# Patient Record
Sex: MALE | Race: WHITE | NOT HISPANIC OR LATINO | Employment: OTHER | RURAL
[De-identification: names, ages, dates, MRNs, and addresses within clinical notes are randomized per-mention and may not be internally consistent; named-entity substitution may affect disease eponyms.]

---

## 2022-04-13 RX ORDER — SILDENAFIL CITRATE 20 MG/1
TABLET ORAL
Qty: 30 TABLET | Refills: 0 | Status: SHIPPED | OUTPATIENT
Start: 2022-04-13 | End: 2022-05-31

## 2022-05-31 RX ORDER — SILDENAFIL CITRATE 20 MG/1
TABLET ORAL
Qty: 30 TABLET | Refills: 0 | Status: SHIPPED | OUTPATIENT
Start: 2022-05-31 | End: 2022-07-13 | Stop reason: SDUPTHER

## 2022-05-31 NOTE — TELEPHONE ENCOUNTER
Last Refill Date: 04/13/2022  Quantity: 30  Refills: 0     [facility] [id number] - [phone] [facility] [phone] FX    Please review pended refill request for any changes needed on refills or quantities. Thank you!

## 2022-06-03 RX ORDER — BUSPIRONE HYDROCHLORIDE 10 MG/1
TABLET ORAL
Qty: 180 TABLET | Refills: 0 | Status: SHIPPED | OUTPATIENT
Start: 2022-06-03 | End: 2022-07-13 | Stop reason: SDUPTHER

## 2022-06-03 RX ORDER — ROPINIROLE 1 MG/1
TABLET, FILM COATED ORAL
Qty: 90 TABLET | Refills: 0 | Status: SHIPPED | OUTPATIENT
Start: 2022-06-03 | End: 2022-07-13 | Stop reason: SDUPTHER

## 2022-06-15 RX ORDER — GEMFIBROZIL 600 MG/1
TABLET, FILM COATED ORAL
Qty: 180 TABLET | OUTPATIENT
Start: 2022-06-15

## 2022-06-15 RX ORDER — GEMFIBROZIL 600 MG/1
TABLET, FILM COATED ORAL
Qty: 180 TABLET | Refills: 0 | Status: SHIPPED | OUTPATIENT
Start: 2022-06-15 | End: 2022-07-13 | Stop reason: SDUPTHER

## 2022-06-23 NOTE — TELEPHONE ENCOUNTER
Pt contacted. Said that he will call back to schedule. Living between here and [place] currently while he takes care of his mom.

## 2022-07-13 ENCOUNTER — TELEMEDICINE (OUTPATIENT)
Dept: FAMILY MEDICINE CLINIC | Facility: CLINIC | Age: 62
End: 2022-07-13

## 2022-07-13 DIAGNOSIS — E11.65 TYPE 2 DIABETES MELLITUS WITH HYPERGLYCEMIA, WITHOUT LONG-TERM CURRENT USE OF INSULIN: ICD-10-CM

## 2022-07-13 DIAGNOSIS — F41.9 ANXIETY: ICD-10-CM

## 2022-07-13 DIAGNOSIS — F33.0 MAJOR DEPRESSIVE DISORDER, RECURRENT, MILD: ICD-10-CM

## 2022-07-13 DIAGNOSIS — E55.9 VITAMIN D DEFICIENCY: ICD-10-CM

## 2022-07-13 DIAGNOSIS — G25.81 RESTLESS LEG SYNDROME: ICD-10-CM

## 2022-07-13 DIAGNOSIS — Z12.5 PROSTATE CANCER SCREENING: ICD-10-CM

## 2022-07-13 DIAGNOSIS — I10 HYPERTENSION, ESSENTIAL: Primary | ICD-10-CM

## 2022-07-13 DIAGNOSIS — E78.2 HYPERLIPIDEMIA, MIXED: ICD-10-CM

## 2022-07-13 PROCEDURE — 99214 OFFICE O/P EST MOD 30 MIN: CPT | Performed by: PHYSICIAN ASSISTANT

## 2022-07-13 RX ORDER — BUSPIRONE HYDROCHLORIDE 10 MG/1
10 TABLET ORAL 2 TIMES DAILY
Qty: 180 TABLET | Refills: 1 | Status: SHIPPED | OUTPATIENT
Start: 2022-07-13 | End: 2023-01-04 | Stop reason: SDUPTHER

## 2022-07-13 RX ORDER — METOPROLOL SUCCINATE 200 MG/1
200 TABLET, EXTENDED RELEASE ORAL DAILY
Qty: 90 TABLET | Refills: 1 | Status: SHIPPED | OUTPATIENT
Start: 2022-07-13 | End: 2023-01-04 | Stop reason: SDUPTHER

## 2022-07-13 RX ORDER — METFORMIN HYDROCHLORIDE 500 MG/1
TABLET, EXTENDED RELEASE ORAL
COMMUNITY
End: 2022-12-14

## 2022-07-13 RX ORDER — ATORVASTATIN CALCIUM 40 MG/1
TABLET, FILM COATED ORAL
Qty: 90 TABLET | Refills: 1 | Status: SHIPPED | OUTPATIENT
Start: 2022-07-13 | End: 2023-01-04 | Stop reason: SDUPTHER

## 2022-07-13 RX ORDER — GEMFIBROZIL 600 MG/1
TABLET, FILM COATED ORAL
Qty: 180 TABLET | Refills: 1 | Status: SHIPPED | OUTPATIENT
Start: 2022-07-13 | End: 2023-01-04 | Stop reason: SDUPTHER

## 2022-07-13 RX ORDER — ATORVASTATIN CALCIUM 40 MG/1
TABLET, FILM COATED ORAL
COMMUNITY
End: 2022-07-13 | Stop reason: SDUPTHER

## 2022-07-13 RX ORDER — ROPINIROLE 1 MG/1
1 TABLET, FILM COATED ORAL NIGHTLY
Qty: 90 TABLET | Refills: 1 | Status: SHIPPED | OUTPATIENT
Start: 2022-07-13 | End: 2023-01-04 | Stop reason: SDUPTHER

## 2022-07-13 RX ORDER — ESOMEPRAZOLE MAGNESIUM 40 MG/1
CAPSULE, DELAYED RELEASE ORAL
COMMUNITY
Start: 2022-05-27 | End: 2022-08-26

## 2022-07-13 RX ORDER — CYCLOBENZAPRINE HCL 10 MG
TABLET ORAL
COMMUNITY
Start: 2022-06-11 | End: 2022-09-28

## 2022-07-13 RX ORDER — BLOOD SUGAR DIAGNOSTIC
STRIP MISCELLANEOUS
COMMUNITY
Start: 2022-05-26 | End: 2023-02-27 | Stop reason: ALTCHOICE

## 2022-07-13 RX ORDER — DULOXETIN HYDROCHLORIDE 60 MG/1
60 CAPSULE, DELAYED RELEASE ORAL DAILY
Qty: 90 CAPSULE | Refills: 1 | Status: SHIPPED | OUTPATIENT
Start: 2022-07-13 | End: 2023-01-04 | Stop reason: SDUPTHER

## 2022-07-13 RX ORDER — SILDENAFIL CITRATE 20 MG/1
TABLET ORAL
Qty: 30 TABLET | Refills: 1 | Status: SHIPPED | OUTPATIENT
Start: 2022-07-13 | End: 2022-10-16

## 2022-07-13 RX ORDER — LANCETS
EACH MISCELLANEOUS
COMMUNITY
Start: 2022-05-26 | End: 2023-02-27 | Stop reason: ALTCHOICE

## 2022-07-13 RX ORDER — METOPROLOL SUCCINATE 200 MG/1
TABLET, EXTENDED RELEASE ORAL
COMMUNITY
Start: 2022-05-13 | End: 2022-07-13 | Stop reason: SDUPTHER

## 2022-07-13 RX ORDER — NIFEDIPINE 30 MG/1
TABLET, FILM COATED, EXTENDED RELEASE ORAL
COMMUNITY
Start: 2022-04-11 | End: 2022-07-13 | Stop reason: SDUPTHER

## 2022-07-13 RX ORDER — DULOXETIN HYDROCHLORIDE 60 MG/1
CAPSULE, DELAYED RELEASE ORAL
COMMUNITY
Start: 2022-04-20 | End: 2022-07-13 | Stop reason: SDUPTHER

## 2022-07-13 RX ORDER — QUINAPRIL 40 MG/1
TABLET ORAL
COMMUNITY
Start: 2022-06-13 | End: 2022-09-20

## 2022-07-13 RX ORDER — NIFEDIPINE 30 MG/1
30 TABLET, FILM COATED, EXTENDED RELEASE ORAL DAILY
Qty: 90 TABLET | Refills: 1 | Status: SHIPPED | OUTPATIENT
Start: 2022-07-13 | End: 2023-01-04 | Stop reason: SDUPTHER

## 2022-07-14 NOTE — PROGRESS NOTES
Chief Complaint  Med Refill    Subjective          Zechariah Contreras presents to [facility]  Patient in today for medication recheck and refills.  Visit done through virtual visit with use of [employer].  Patient has given permission for this visit along with understands limitation of virtual visits.  He states he has been feeling well.  Denies any chest pain or shortness of breath.  He will be coming in for his fasting labs.  He states the duloxetine and buspirone continue to help with his anxiety and depression symptoms and would like to continue at this time.  Denies any side effects of medication.  He is also needing to get a refill on his metformin and Jardiance that he is taking for his diabetes management.  He is currently up-to-date on his colonoscopy.  Is due for his eye exam and he states will get scheduled.    Hypertension  This is a chronic problem. Associated symptoms include anxiety. Pertinent negatives include no blurred vision, chest pain, headaches, palpitations, peripheral edema or shortness of breath.   Hyperlipidemia  This is a chronic problem. Pertinent negatives include no chest pain or shortness of breath. Current antihyperlipidemic treatment includes statins.   Diabetes  He presents for his follow-up diabetic visit. He has type 2 diabetes mellitus. Pertinent negatives for hypoglycemia include no dizziness or headaches. Pertinent negatives for diabetes include no blurred vision, no chest pain and no fatigue. Eye exam is not current.   Anxiety  Presents for follow-up visit. Patient reports no chest pain, dizziness, nausea, palpitations or shortness of breath.     His past medical history is significant for depression.   Depression  Visit Type: follow-up  Patient is not experiencing: chest pain, dizziness, palpitations and shortness of breath.        Objective   Vital Signs:   There were no vitals taken for this visit.    There is no height or weight on file to  calculate BMI.    Review of Systems   Constitutional: Negative for fatigue and fever.   HENT: Negative for congestion and sore throat.    Eyes: Negative for blurred vision.   Respiratory: Negative for shortness of breath.    Cardiovascular: Negative for chest pain and palpitations.   Gastrointestinal: Negative for abdominal pain, constipation, diarrhea, nausea and vomiting.   Genitourinary: Negative for dysuria and frequency.   Neurological: Negative for dizziness and headache.       Past History:  Medical History: has a past medical history of Anxiety, AR (allergic rhinitis), Asthma due to environmental allergies, Chronic insomnia, Chronic low back pain, Chronic neck pain, CKD (chronic kidney disease), stage II, Depression, ED (erectile dysfunction), Essential hypertension, Fatigue, GERD (gastroesophageal reflux disease), High risk medication use, IBS (irritable bowel syndrome), Idiopathic nonfamilial dystonia, Impotence, Insomnia, Insomnia, Major depression in remission (HCC), Mixed hyperlipidemia, Morbid obesity ([facility]), JESSICA (obstructive sleep apnea), RLS (restless legs syndrome), Rotator cuff tear, Sinus headache, Snoring, Spasmodic torticollis, Type 2 diabetes mellitus ([facility]), and Vitamin D deficiency.   Surgical History: has a past surgical history that includes Rotator cuff repair.   Family History: family history includes Lung cancer in his father.   Social History: reports that he has never smoked. He has never used smokeless tobacco.      Current Outpatient Medications:   •  atorvastatin (LIPITOR) 40 MG tablet, Take one pill by oral route once a day., Disp: 90 tablet, Rfl: 1  •  busPIRone (BUSPAR) 10 MG tablet, Take 1 tablet by mouth 2 (Two) Times a Day., Disp: 180 tablet, Rfl: 1  •  cyclobenzaprine (FLEXERIL) 10 MG tablet, , Disp: , Rfl:   •  DULoxetine (CYMBALTA) 60 MG capsule, Take 1 capsule by mouth Daily., Disp: 90 capsule, Rfl: 1  •  empagliflozin (Jardiance) 10 MG tablet tablet, Take 1 tablet by mouth  Daily., Disp: 90 tablet, Rfl: 1  •  esomeprazole (nexIUM) 40 MG capsule, , Disp: , Rfl:   •  gemfibrozil (LOPID) 600 MG tablet, Take one tablet by mouth twice a day 30 minutes before morning and evening meals., Disp: 180 tablet, Rfl: 1  •  Lancets (onetouch ultrasoft) lancets, , Disp: , Rfl:   •  metFORMIN ER (GLUCOPHAGE-XR) 500 MG 24 hr tablet, metformin  mg tablet,extended release 24 hr, Disp: , Rfl:   •  metoprolol succinate XL (TOPROL-XL) 200 MG 24 hr tablet, Take 1 tablet by mouth Daily., Disp: 90 tablet, Rfl: 1  •  NIFEdipine CC (ADALAT CC) 30 MG 24 hr tablet, Take 1 tablet by mouth Daily., Disp: 90 tablet, Rfl: 1  •  OneTouch Ultra test strip, , Disp: , Rfl:   •  quinapril (ACCUPRIL) 40 MG tablet, , Disp: , Rfl:   •  rOPINIRole (REQUIP) 1 MG tablet, Take 1 tablet by mouth Every Night., Disp: 90 tablet, Rfl: 1  •  sildenafil (REVATIO) 20 MG tablet, Take 2 - 5 tablets by mouth 30 minutes prior to intercourse as needed, Disp: 30 tablet, Rfl: 1  Allergies: Amoxicillin    Physical Exam  Constitutional:       Appearance: Normal appearance.   Pulmonary:      Effort: Pulmonary effort is normal.   Abdominal:      Tenderness: There is no abdominal tenderness.   Psychiatric:         Mood and Affect: Mood normal.         Behavior: Behavior normal.             Assessment and Plan   Diagnoses and all orders for this visit:    1. Hypertension, essential (Primary)  -     CBC & Differential; Future  -     Comprehensive Metabolic Panel; Future  -     TSH; Future  Patient voices understanding of limitation of virtual visit.  Recommend to  monitor blood pressure.  He will continue on current dosage of medication at this time.  Return to clinic prior to his next recheck as needed.  2. Hyperlipidemia, mixed  -     Lipid Panel; Future  Refilled atorvastatin and gemfibrozil.  Patient denies any side effects of medication.  He will come in for fasting labs.  Encouraged healthy diet and exercise.  3. Major depressive disorder,  recurrent, mild (HCC)  Patient is currently pleased with the current dosage of duloxetine and buspirone and would like to continue at this time.  Denies side effects of medication.  Return to clinic prior to recheck as needed.  4. Anxiety    5. Type 2 diabetes mellitus with hyperglycemia, without long-term current use of insulin (HCC)  -     Hemoglobin A1c; Future  Encourage patient to monitor his sugar levels at home.  Encouraged healthy diet and exercise.  Refilled  the Jardiance and metformin.  We will check hemoglobin A1c with labs.  6. Prostate cancer screening  -     PSA Screen; Future  PSA with labs.  7. Vitamin D deficiency  -     Vitamin D 25 Hydroxy; Future  Vitamin D with labs.  8. Restless leg syndrome  Refilled requip- pt states continues to work well for his restless leg symptoms.   Other orders  -     empagliflozin (Jardiance) 10 MG tablet tablet; Take 1 tablet by mouth Daily.  Dispense: 90 tablet; Refill: 1  -     DULoxetine (CYMBALTA) 60 MG capsule; Take 1 capsule by mouth Daily.  Dispense: 90 capsule; Refill: 1  -     busPIRone (BUSPAR) 10 MG tablet; Take 1 tablet by mouth 2 (Two) Times a Day.  Dispense: 180 tablet; Refill: 1  -     NIFEdipine CC (ADALAT CC) 30 MG 24 hr tablet; Take 1 tablet by mouth Daily.  Dispense: 90 tablet; Refill: 1  -     gemfibrozil (LOPID) 600 MG tablet; Take one tablet by mouth twice a day 30 minutes before morning and evening meals.  Dispense: 180 tablet; Refill: 1  -     atorvastatin (LIPITOR) 40 MG tablet; Take one pill by oral route once a day.  Dispense: 90 tablet; Refill: 1  -     metoprolol succinate XL (TOPROL-XL) 200 MG 24 hr tablet; Take 1 tablet by mouth Daily.  Dispense: 90 tablet; Refill: 1  -     rOPINIRole (REQUIP) 1 MG tablet; Take 1 tablet by mouth Every Night.  Dispense: 90 tablet; Refill: 1  -     sildenafil (REVATIO) 20 MG tablet; Take 2 - 5 tablets by mouth 30 minutes prior to intercourse as needed  Dispense: 30 tablet; Refill: 1            Follow Up    No follow-ups on file.  Patient was given instructions and counseling regarding his condition or for health maintenance advice. Please see specific information pulled into the AVS if appropriate.     Divya Elise PA-C

## 2022-08-26 RX ORDER — ESOMEPRAZOLE MAGNESIUM 40 MG/1
CAPSULE, DELAYED RELEASE ORAL
Qty: 90 CAPSULE | Refills: 1 | Status: SHIPPED | OUTPATIENT
Start: 2022-08-26 | End: 2023-01-04 | Stop reason: SDUPTHER

## 2022-09-20 RX ORDER — QUINAPRIL 40 MG/1
TABLET ORAL
Qty: 90 TABLET | Refills: 0 | Status: SHIPPED | OUTPATIENT
Start: 2022-09-20 | End: 2023-01-04 | Stop reason: SDUPTHER

## 2022-09-26 NOTE — TELEPHONE ENCOUNTER
Please check to see if need to send a refill as appears last one sent in 11/2021---- also please remind patient he has not come in for fasting labs from telehealth visit in 7/2022; thanks

## 2022-09-27 ENCOUNTER — TELEPHONE (OUTPATIENT)
Dept: FAMILY MEDICINE CLINIC | Facility: CLINIC | Age: 62
End: 2022-09-27

## 2022-09-27 RX ORDER — BACLOFEN 20 MG/1
TABLET ORAL
Qty: 360 TABLET | OUTPATIENT
Start: 2022-09-27

## 2022-09-27 NOTE — TELEPHONE ENCOUNTER
Please double check with patient- he has cyclobenzaprine in his active medication list- not from us- does not need baclofen if taking this; thanks

## 2022-09-29 RX ORDER — BACLOFEN 20 MG/1
TABLET ORAL
Qty: 270 TABLET | Refills: 0 | Status: SHIPPED | OUTPATIENT
Start: 2022-09-29 | End: 2023-01-04 | Stop reason: SDUPTHER

## 2022-09-29 RX ORDER — BACLOFEN 20 MG/1
TABLET ORAL
Qty: 360 TABLET | OUTPATIENT
Start: 2022-09-29

## 2022-09-29 NOTE — TELEPHONE ENCOUNTER
Sent back to [facility] looks like this was the binta we were trying to figure out if he was taking flexeril and this but he is only using this and they are calling again?

## 2022-10-04 ENCOUNTER — TELEPHONE (OUTPATIENT)
Dept: FAMILY MEDICINE CLINIC | Facility: CLINIC | Age: 62
End: 2022-10-04

## 2022-10-16 RX ORDER — SILDENAFIL CITRATE 20 MG/1
TABLET ORAL
Qty: 30 TABLET | Refills: 0 | Status: SHIPPED | OUTPATIENT
Start: 2022-10-16 | End: 2022-12-19

## 2022-10-20 ENCOUNTER — TELEPHONE (OUTPATIENT)
Dept: FAMILY MEDICINE CLINIC | Facility: CLINIC | Age: 62
End: 2022-10-20

## 2022-10-20 RX ORDER — FLUTICASONE PROPIONATE 50 MCG
SPRAY, SUSPENSION (ML) NASAL
Qty: 48 G | Refills: 0 | Status: SHIPPED | OUTPATIENT
Start: 2022-10-20

## 2022-10-20 RX ORDER — SILDENAFIL CITRATE 20 MG/1
TABLET ORAL
Qty: 30 TABLET | Refills: 0 | OUTPATIENT
Start: 2022-10-20

## 2022-10-20 NOTE — TELEPHONE ENCOUNTER
He said it was actually flonase and looking back in the old chart he had got it before the script had just .

## 2022-12-01 ENCOUNTER — TELEPHONE (OUTPATIENT)
Dept: FAMILY MEDICINE CLINIC | Facility: CLINIC | Age: 62
End: 2022-12-01

## 2022-12-01 NOTE — TELEPHONE ENCOUNTER
Caller: ESTEPHANIA PETERSEN    Relationship to patient: Emergency Contact    Best call back number: [phone]    Patient is needing: ESTEPHANIA STATED THAT PATIENT WAS WANTING TO KNOW WHEN HIS NEXT COLONOSCOPY DUE    PLEASE ADVISE

## 2022-12-13 RX ORDER — BACLOFEN 20 MG/1
TABLET ORAL
Qty: 270 TABLET | Refills: 0 | OUTPATIENT
Start: 2022-12-13

## 2022-12-13 RX ORDER — QUINAPRIL 40 MG/1
TABLET ORAL
Qty: 90 TABLET | Refills: 0 | OUTPATIENT
Start: 2022-12-13

## 2022-12-14 ENCOUNTER — TELEMEDICINE (OUTPATIENT)
Dept: FAMILY MEDICINE CLINIC | Facility: CLINIC | Age: 62
End: 2022-12-14

## 2022-12-14 DIAGNOSIS — Z12.5 PROSTATE CANCER SCREENING: ICD-10-CM

## 2022-12-14 DIAGNOSIS — G25.81 RESTLESS LEG SYNDROME: ICD-10-CM

## 2022-12-14 DIAGNOSIS — E78.2 HYPERLIPIDEMIA, MIXED: ICD-10-CM

## 2022-12-14 DIAGNOSIS — E11.65 TYPE 2 DIABETES MELLITUS WITH HYPERGLYCEMIA, WITHOUT LONG-TERM CURRENT USE OF INSULIN: ICD-10-CM

## 2022-12-14 DIAGNOSIS — F41.9 ANXIETY: ICD-10-CM

## 2022-12-14 DIAGNOSIS — F33.0 MAJOR DEPRESSIVE DISORDER, RECURRENT, MILD: ICD-10-CM

## 2022-12-14 DIAGNOSIS — J30.9 ALLERGIC RHINITIS, UNSPECIFIED SEASONALITY, UNSPECIFIED TRIGGER: ICD-10-CM

## 2022-12-14 DIAGNOSIS — K21.9 GASTROESOPHAGEAL REFLUX DISEASE, UNSPECIFIED WHETHER ESOPHAGITIS PRESENT: ICD-10-CM

## 2022-12-14 DIAGNOSIS — I10 HYPERTENSION, ESSENTIAL: Primary | ICD-10-CM

## 2022-12-14 DIAGNOSIS — M79.10 MYALGIA: ICD-10-CM

## 2022-12-14 PROCEDURE — 99214 OFFICE O/P EST MOD 30 MIN: CPT | Performed by: PHYSICIAN ASSISTANT

## 2022-12-14 NOTE — PROGRESS NOTES
Chief Complaint  Med Refill    Subjective         Zechariah Contreras presents to [facility]  History of Present Illness  Visit done through [employer], patient unable to access [employer].  Visit done with patient permission.  He is in today for medication recheck and refills.  States overall has been doing well.  He admits he does not check his blood pressure or his sugar levels at home very often.  He is due for an eye exam and states will get scheduled.  He was due to come in previously for some recheck on labs and had not gotten those done yet.  He is on the lab schedule in 2 days to come in and have fasting labs drawn.  He states he has been taking his medication daily.  Denies any chest pain or shortness of breath.  Denies any bowel or urine changes.  He states the duloxetine and buspirone continue to work well for his anxiety and depression symptoms.Denies any side effects of medications.   Hypertension  This is a chronic problem. Associated symptoms include anxiety. Pertinent negatives include no blurred vision, chest pain, headaches, palpitations, peripheral edema or shortness of breath.   Hyperlipidemia  This is a chronic problem. Pertinent negatives include no chest pain or shortness of breath. Current antihyperlipidemic treatment includes statins.   Anxiety  Presents for follow-up visit. Symptoms include depressed mood and nervous/anxious behavior. Patient reports no chest pain, dizziness, nausea, palpitations or shortness of breath.     His past medical history is significant for depression.   Depression  Visit Type: follow-up  Patient presents with the following symptoms: depressed mood and nervousness/anxiety.  Patient is not experiencing: chest pain, palpitations and shortness of breath.    Back Pain  This is a chronic problem. The current episode started more than 1 year ago. The quality of the pain is described as aching. Pertinent negatives include no abdominal pain, chest  pain, dysuria, fever or headaches.   Heartburn  He complains of heartburn. He reports no abdominal pain, no chest pain, no coughing, no hoarse voice, no nausea or no sore throat. Pertinent negatives include no fatigue.     Review of Systems   Constitutional: Negative for fatigue and fever.   HENT: Negative for congestion, hoarse voice and sore throat.    Eyes: Negative for blurred vision.   Respiratory: Negative for cough and shortness of breath.    Cardiovascular: Negative for chest pain and palpitations.   Gastrointestinal: Negative for abdominal pain, diarrhea, nausea and vomiting.   Genitourinary: Negative for dysuria and frequency.   Musculoskeletal: Positive for back pain.   Neurological: Negative for dizziness and headache.   Psychiatric/Behavioral: Positive for depressed mood. The patient is nervous/anxious.        Objective   Vital Signs:   There were no vitals taken for this visit.    Physical Exam   Constitutional: He appears well-developed and well-nourished.   Pulmonary/Chest: Effort normal.   Psychiatric: He has a normal mood and affect.     Result Review :                 Assessment and Plan    Diagnoses and all orders for this visit:    1. Hypertension, essential (Primary)  Discussed limitation of virtual visit. Patient to come in for fasting labs this week and recommend to get bp checked when here. Encouraged to monitor at home. RTC prior to recheck as needed.   2. Hyperlipidemia, mixed  Refilled meds and will check fasting lipid panel with labs.   3. Type 2 diabetes mellitus with hyperglycemia, without long-term current use of insulin (HCC)  Encouraged patient to monitor sugar levels at home. Will check hga1c with labs and may need to adjust medication.   4. Anxiety  Patient is pleased with the current dosage of duloxetine and buspar and would like to continue at this time. Denies side effects of medication.   5. Major depressive disorder, recurrent, mild (HCC)    6. Allergic rhinitis, unspecified  seasonality, unspecified trigger  Refilled flonase.   7. Gastroesophageal reflux disease, unspecified whether esophagitis present  Refilled nexium for reflux.   8. Myalgia  Baclofen refilled to use prn- pt denies side effects of medication.   9. Prostate cancer screening  PSA with labs.   10. Restless leg syndrome  Refilled ropinirole.     Follow Up   No follow-ups on file.  Patient was given instructions and counseling regarding his condition or for health maintenance advice. Please see specific information pulled into the AVS if appropriate.     Mode of Visit: Video  Location of patient: home  Location of provider[facility]  You have chosen to receive care through a telehealth visit.  Does the patient consent to use a video/audio connection for your medical care today? Yes  The visit included audio and video interaction. No technical issues occurred during this visit.

## 2022-12-16 ENCOUNTER — LAB (OUTPATIENT)
Dept: FAMILY MEDICINE CLINIC | Facility: CLINIC | Age: 62
End: 2022-12-16

## 2022-12-16 DIAGNOSIS — E11.65 TYPE 2 DIABETES MELLITUS WITH HYPERGLYCEMIA, WITHOUT LONG-TERM CURRENT USE OF INSULIN: ICD-10-CM

## 2022-12-16 DIAGNOSIS — Z12.5 PROSTATE CANCER SCREENING: ICD-10-CM

## 2022-12-16 DIAGNOSIS — E78.2 HYPERLIPIDEMIA, MIXED: ICD-10-CM

## 2022-12-16 DIAGNOSIS — E55.9 VITAMIN D DEFICIENCY: ICD-10-CM

## 2022-12-16 DIAGNOSIS — I10 HYPERTENSION, ESSENTIAL: ICD-10-CM

## 2022-12-16 PROCEDURE — 36415 COLL VENOUS BLD VENIPUNCTURE: CPT | Performed by: PHYSICIAN ASSISTANT

## 2022-12-17 LAB
25(OH)D3+25(OH)D2 SERPL-MCNC: 27.2 NG/ML (ref 30–100)
ALBUMIN SERPL-MCNC: 5.1 G/DL (ref 3.8–4.8)
ALBUMIN/GLOB SERPL: 2 {RATIO} (ref 1.2–2.2)
ALP SERPL-CCNC: 90 IU/L (ref 44–121)
ALT SERPL-CCNC: 19 IU/L (ref 0–44)
AST SERPL-CCNC: 15 IU/L (ref 0–40)
BASOPHILS # BLD AUTO: 0 X10E3/UL (ref 0–0.2)
BASOPHILS NFR BLD AUTO: 1 %
BILIRUB SERPL-MCNC: <0.2 MG/DL (ref 0–1.2)
BUN SERPL-MCNC: 20 MG/DL (ref 8–27)
BUN/CREAT SERPL: 18 (ref 10–24)
CALCIUM SERPL-MCNC: 10.4 MG/DL (ref 8.6–10.2)
CHLORIDE SERPL-SCNC: 104 MMOL/L (ref 96–106)
CHOLEST SERPL-MCNC: 184 MG/DL (ref 100–199)
CO2 SERPL-SCNC: 20 MMOL/L (ref 20–29)
CREAT SERPL-MCNC: 1.12 MG/DL (ref 0.76–1.27)
EGFRCR SERPLBLD CKD-EPI 2021: 74 ML/MIN/1.73
EOSINOPHIL # BLD AUTO: 0.2 X10E3/UL (ref 0–0.4)
EOSINOPHIL NFR BLD AUTO: 4 %
ERYTHROCYTE [DISTWIDTH] IN BLOOD BY AUTOMATED COUNT: 11.7 % (ref 11.6–15.4)
GLOBULIN SER CALC-MCNC: 2.5 G/DL (ref 1.5–4.5)
GLUCOSE SERPL-MCNC: 271 MG/DL (ref 70–99)
HBA1C MFR BLD: 11.2 % (ref 4.8–5.6)
HCT VFR BLD AUTO: 43.6 % (ref 37.5–51)
HDLC SERPL-MCNC: 29 MG/DL
HGB BLD-MCNC: 14.9 G/DL (ref 13–17.7)
IMM GRANULOCYTES # BLD AUTO: 0 X10E3/UL (ref 0–0.1)
IMM GRANULOCYTES NFR BLD AUTO: 1 %
LDLC SERPL CALC-MCNC: 99 MG/DL (ref 0–99)
LYMPHOCYTES # BLD AUTO: 1.9 X10E3/UL (ref 0.7–3.1)
LYMPHOCYTES NFR BLD AUTO: 37 %
MCH RBC QN AUTO: 30.9 PG (ref 26.6–33)
MCHC RBC AUTO-ENTMCNC: 34.2 G/DL (ref 31.5–35.7)
MCV RBC AUTO: 91 FL (ref 79–97)
MONOCYTES # BLD AUTO: 0.6 X10E3/UL (ref 0.1–0.9)
MONOCYTES NFR BLD AUTO: 11 %
NEUTROPHILS # BLD AUTO: 2.5 X10E3/UL (ref 1.4–7)
NEUTROPHILS NFR BLD AUTO: 46 %
PLATELET # BLD AUTO: 310 X10E3/UL (ref 150–450)
POTASSIUM SERPL-SCNC: 5.5 MMOL/L (ref 3.5–5.2)
PROT SERPL-MCNC: 7.6 G/DL (ref 6–8.5)
PSA SERPL-MCNC: 0.1 NG/ML (ref 0–4)
RBC # BLD AUTO: 4.82 X10E6/UL (ref 4.14–5.8)
SODIUM SERPL-SCNC: 142 MMOL/L (ref 134–144)
TRIGL SERPL-MCNC: 328 MG/DL (ref 0–149)
TSH SERPL DL<=0.005 MIU/L-ACNC: 1.15 UIU/ML (ref 0.45–4.5)
VLDLC SERPL CALC-MCNC: 56 MG/DL (ref 5–40)
WBC # BLD AUTO: 5.3 X10E3/UL (ref 3.4–10.8)

## 2022-12-19 ENCOUNTER — TELEPHONE (OUTPATIENT)
Dept: FAMILY MEDICINE CLINIC | Facility: CLINIC | Age: 62
End: 2022-12-19

## 2022-12-19 DIAGNOSIS — E87.5 HYPERKALEMIA: ICD-10-CM

## 2022-12-19 DIAGNOSIS — E11.65 TYPE 2 DIABETES MELLITUS WITH HYPERGLYCEMIA, WITHOUT LONG-TERM CURRENT USE OF INSULIN: Primary | ICD-10-CM

## 2022-12-19 RX ORDER — SILDENAFIL CITRATE 20 MG/1
TABLET ORAL
Qty: 30 TABLET | Refills: 0 | Status: SHIPPED | OUTPATIENT
Start: 2022-12-19 | End: 2023-02-24

## 2022-12-22 RX ORDER — SILDENAFIL CITRATE 20 MG/1
TABLET ORAL
Qty: 30 TABLET | Refills: 0 | OUTPATIENT
Start: 2022-12-22

## 2023-01-03 ENCOUNTER — LAB (OUTPATIENT)
Dept: FAMILY MEDICINE CLINIC | Facility: CLINIC | Age: 63
End: 2023-01-03
Payer: MEDICAID

## 2023-01-03 DIAGNOSIS — E87.5 HYPERKALEMIA: ICD-10-CM

## 2023-01-03 PROCEDURE — 36415 COLL VENOUS BLD VENIPUNCTURE: CPT | Performed by: PHYSICIAN ASSISTANT

## 2023-01-03 NOTE — TELEPHONE ENCOUNTER
Caller: ESTEPHANIA PETERSEN    Relationship: Emergency Contact    Best call back number: [phone]    Requested Prescriptions:   Requested Prescriptions     Pending Prescriptions Disp Refills   • quinapril (ACCUPRIL) 40 MG tablet 90 tablet 0     Sig: Take 1 tablet by mouth Daily.        Pharmacy where request should be sent: [facility] [id number] [address] - [phone]  - [phone] FX     Additional details provided by patient: HAS 6 DAYS LEFT    Does the patient have less than a 3 day supply:  [] Yes  [x] No    Would you like a call back once the refill request has been completed: [x] Yes [] No    If the office needs to give you a call back, can they leave a voicemail: [x] Yes [] No    Birdie Benitez Rep   01/03/23 14:57 EST

## 2023-01-04 LAB
ALBUMIN SERPL-MCNC: 5.1 G/DL (ref 3.8–4.8)
ALBUMIN/GLOB SERPL: 2 {RATIO} (ref 1.2–2.2)
ALP SERPL-CCNC: 100 IU/L (ref 44–121)
ALT SERPL-CCNC: 24 IU/L (ref 0–44)
AST SERPL-CCNC: 22 IU/L (ref 0–40)
BILIRUB SERPL-MCNC: <0.2 MG/DL (ref 0–1.2)
BUN SERPL-MCNC: 17 MG/DL (ref 8–27)
BUN/CREAT SERPL: 15 (ref 10–24)
CALCIUM SERPL-MCNC: 10.3 MG/DL (ref 8.6–10.2)
CHLORIDE SERPL-SCNC: 103 MMOL/L (ref 96–106)
CO2 SERPL-SCNC: 21 MMOL/L (ref 20–29)
CREAT SERPL-MCNC: 1.17 MG/DL (ref 0.76–1.27)
EGFRCR SERPLBLD CKD-EPI 2021: 70 ML/MIN/1.73
GLOBULIN SER CALC-MCNC: 2.6 G/DL (ref 1.5–4.5)
GLUCOSE SERPL-MCNC: 297 MG/DL (ref 70–99)
POTASSIUM SERPL-SCNC: 5.5 MMOL/L (ref 3.5–5.2)
PROT SERPL-MCNC: 7.7 G/DL (ref 6–8.5)
SODIUM SERPL-SCNC: 144 MMOL/L (ref 134–144)

## 2023-01-04 RX ORDER — GEMFIBROZIL 600 MG/1
TABLET, FILM COATED ORAL
Qty: 180 TABLET | Refills: 1 | Status: SHIPPED | OUTPATIENT
Start: 2023-01-04

## 2023-01-04 RX ORDER — BUSPIRONE HYDROCHLORIDE 10 MG/1
10 TABLET ORAL 2 TIMES DAILY
Qty: 180 TABLET | Refills: 1 | Status: SHIPPED | OUTPATIENT
Start: 2023-01-04

## 2023-01-04 RX ORDER — METOPROLOL SUCCINATE 200 MG/1
200 TABLET, EXTENDED RELEASE ORAL DAILY
Qty: 90 TABLET | Refills: 1 | Status: SHIPPED | OUTPATIENT
Start: 2023-01-04

## 2023-01-04 RX ORDER — ESOMEPRAZOLE MAGNESIUM 40 MG/1
40 CAPSULE, DELAYED RELEASE ORAL DAILY
Qty: 90 CAPSULE | Refills: 1 | Status: SHIPPED | OUTPATIENT
Start: 2023-01-04

## 2023-01-04 RX ORDER — BACLOFEN 20 MG/1
TABLET ORAL
Qty: 270 TABLET | Refills: 0 | Status: SHIPPED | OUTPATIENT
Start: 2023-01-04 | End: 2023-03-03

## 2023-01-04 RX ORDER — DULOXETIN HYDROCHLORIDE 60 MG/1
60 CAPSULE, DELAYED RELEASE ORAL DAILY
Qty: 90 CAPSULE | Refills: 1 | Status: SHIPPED | OUTPATIENT
Start: 2023-01-04

## 2023-01-04 RX ORDER — ATORVASTATIN CALCIUM 40 MG/1
TABLET, FILM COATED ORAL
Qty: 90 TABLET | Refills: 1 | Status: SHIPPED | OUTPATIENT
Start: 2023-01-04

## 2023-01-04 RX ORDER — QUINAPRIL 40 MG/1
40 TABLET ORAL DAILY
Qty: 90 TABLET | Refills: 1 | Status: SHIPPED | OUTPATIENT
Start: 2023-01-04 | End: 2023-01-11 | Stop reason: SDUPTHER

## 2023-01-04 RX ORDER — NIFEDIPINE 30 MG/1
30 TABLET, FILM COATED, EXTENDED RELEASE ORAL DAILY
Qty: 90 TABLET | Refills: 1 | Status: SHIPPED | OUTPATIENT
Start: 2023-01-04

## 2023-01-04 RX ORDER — ROPINIROLE 1 MG/1
1 TABLET, FILM COATED ORAL NIGHTLY
Qty: 90 TABLET | Refills: 1 | Status: SHIPPED | OUTPATIENT
Start: 2023-01-04

## 2023-01-05 RX ORDER — QUINAPRIL 40 MG/1
40 TABLET ORAL DAILY
Qty: 90 TABLET | Refills: 0 | OUTPATIENT
Start: 2023-01-05

## 2023-01-06 ENCOUNTER — TELEPHONE (OUTPATIENT)
Dept: FAMILY MEDICINE CLINIC | Facility: CLINIC | Age: 63
End: 2023-01-06
Payer: MEDICAID

## 2023-01-06 NOTE — TELEPHONE ENCOUNTER
Pharmacy called said pt quinapril is on back order is there something else that can be called in?

## 2023-01-06 NOTE — TELEPHONE ENCOUNTER
I would recommend a short term refill at another pharmacy if this is only at his current one until they can get it in.

## 2023-01-06 NOTE — TELEPHONE ENCOUNTER
ESTEPHANIA CALLED TO SAY QUINAPRIL IS ON BACK ORDER.  IS THERE SOMETHING ELSE MILLY CAN TAKE?  HE HAS ENOUGH UNTIL [id number]

## 2023-01-09 ENCOUNTER — TELEPHONE (OUTPATIENT)
Dept: FAMILY MEDICINE CLINIC | Facility: CLINIC | Age: 63
End: 2023-01-09
Payer: MEDICAID

## 2023-01-09 NOTE — TELEPHONE ENCOUNTER
[facility] called and stated this patient was transferring there meds over and the Quinapril is on backorder and they aren't sure when they will get it back in stock. They have even called [facility] and they are ou as well. They was needing something else called in for him until they can get it back in stock.

## 2023-01-09 NOTE — TELEPHONE ENCOUNTER
Caller: ESTEPHANIA PETERSEN    Relationship: Emergency Contact    Best call back number:    [phone]      Who are you requesting to speak with (clinical staff, provider,  specific staff member): CLINICAL  What was the call regarding: QUINAPRIL CAN PITIENT TAKE THE 20 MG TWICE DAILY OR 2 AT A TIME SINCE ON BACK ORDER ON THE 40 MG  Do you require a callback: YES, PLEASE ADVISE

## 2023-01-11 ENCOUNTER — TELEPHONE (OUTPATIENT)
Dept: FAMILY MEDICINE CLINIC | Facility: CLINIC | Age: 63
End: 2023-01-11
Payer: MEDICAID

## 2023-01-11 RX ORDER — QUINAPRIL 20 MG/1
40 TABLET ORAL DAILY
Qty: 60 TABLET | Refills: 1 | Status: SHIPPED | OUTPATIENT
Start: 2023-01-11

## 2023-01-11 NOTE — TELEPHONE ENCOUNTER
Mrs. Contreras called again - said they have heard nothing about the issue with Pt's QUINAPRIL. [facility] said they were completely out of this medication, and [facility] has it but only in 20mg. Pt needs to know if they can switch to the 20mg (if a new script is needed) and if he should take 2 tabs 1 time per day, or 1 tab 2x per day. Pt is completely out of this medication at this time. Please call Mily to advise [phone]

## 2023-01-11 NOTE — TELEPHONE ENCOUNTER
He can take 2 of the 20 mg tablets at this point. I would recommend that Genny decide if this is the best course of action until the medication is not on back order any further. I will send in the 20mg 2 tablets daily.

## 2023-01-11 NOTE — TELEPHONE ENCOUNTER
Caller: ESTEPHANIA PETERSEN    Relationship: Emergency Contact    Best call back number: [phone]    What was the call regarding: PATIENT WAS GIVEN HUB TO READ MESSAGE.  PATIENT ASKED FOR quinapril (ACCUPRIL) 20 MG tablet TO GO TO [id number] [place], [place] - [address] [facility] AT [facility] [address] [facility] - [phone] [facility] [phone] FX      Do you require a callback: YES

## 2023-01-11 NOTE — TELEPHONE ENCOUNTER
"LVM for pt to call back  HUB TO READ  \"He can take 2 of the 20 mg tablets at this point. I would recommend that Genny decide if this is the best course of action until the medication is not on back order any further. I will send in the 20mg 2 tablets daily.\"  "

## 2023-01-19 ENCOUNTER — TELEPHONE (OUTPATIENT)
Dept: FAMILY MEDICINE CLINIC | Facility: CLINIC | Age: 63
End: 2023-01-19
Payer: MEDICAID

## 2023-01-19 DIAGNOSIS — E87.5 HYPERKALEMIA: Primary | ICD-10-CM

## 2023-01-19 NOTE — TELEPHONE ENCOUNTER
Please let know recheck on labs continues to show mild elevation to calcium at 10.3  and sugar elevated at 297- please keep f/up with endocrinology -- also potassium recheck remained same at 5.5-- this often times can be from where blood hemolyzes in tube and causes false elevation- only way to know if that is source or if really elevated is to have blood drawn at hospital where the lab is on site-- so would recommend to have him  an order and have drawn at hospital-- if is staying elevated would need to adjust medication; thanks.

## 2023-01-19 NOTE — TELEPHONE ENCOUNTER
----- Message from Candice Cheatham MA sent at 1/6/2023  3:36 PM EST -----  Sending to Genny. Per lino it can wait until you get back.

## 2023-02-15 ENCOUNTER — PATIENT OUTREACH (OUTPATIENT)
Dept: CASE MANAGEMENT | Facility: OTHER | Age: 63
End: 2023-02-15
Payer: MEDICAID

## 2023-02-15 ENCOUNTER — TELEPHONE (OUTPATIENT)
Dept: CASE MANAGEMENT | Facility: OTHER | Age: 63
End: 2023-02-15
Payer: MEDICAID

## 2023-02-15 DIAGNOSIS — F33.0 MAJOR DEPRESSIVE DISORDER, RECURRENT, MILD: ICD-10-CM

## 2023-02-15 DIAGNOSIS — E11.65 TYPE 2 DIABETES MELLITUS WITH HYPERGLYCEMIA, WITHOUT LONG-TERM CURRENT USE OF INSULIN: Primary | ICD-10-CM

## 2023-02-15 NOTE — OUTREACH NOTE
AMBULATORY CASE MANAGEMENT NOTE    Name and Relationship of Patient/Support Person: Ben Zechariah N - Self  Self    CCM Interim Update    Called patient to discuss CCM services. He is interested, but he would like for insurance to be contacted prior.    Patient states he is doing better since hospitalization. He states he fell and was sent to [facility] for further evaluation. Patient is somewhat bruised. He is not sure what caused fall, because he does not remember incident. Encouraged patient to avoid strenuous activities and rest between actions.     Spoke to patient about diabetes and hypertension management. Advised patient to record blood pressure and blood sugar values twice daily for MD review. Also advised patient to contact ACM if blood sugar is less than 100 or greater than 200. Pt verbalized understanding.       Education Documentation  No documentation found.        Tisha FLORES  Ambulatory Case Management    2/15/2023, 15:49 EST

## 2023-02-22 ENCOUNTER — TELEPHONE (OUTPATIENT)
Dept: CASE MANAGEMENT | Facility: OTHER | Age: 63
End: 2023-02-22
Payer: MEDICAID

## 2023-02-23 ENCOUNTER — TELEPHONE (OUTPATIENT)
Dept: CASE MANAGEMENT | Facility: OTHER | Age: 63
End: 2023-02-23
Payer: MEDICAID

## 2023-02-24 RX ORDER — SILDENAFIL CITRATE 20 MG/1
TABLET ORAL
Qty: 30 TABLET | Refills: 0 | Status: SHIPPED | OUTPATIENT
Start: 2023-02-24

## 2023-02-27 ENCOUNTER — TELEPHONE (OUTPATIENT)
Dept: CASE MANAGEMENT | Facility: OTHER | Age: 63
End: 2023-02-27
Payer: MEDICAID

## 2023-02-27 ENCOUNTER — PATIENT OUTREACH (OUTPATIENT)
Dept: CASE MANAGEMENT | Facility: OTHER | Age: 63
End: 2023-02-27
Payer: MEDICAID

## 2023-02-27 DIAGNOSIS — F33.0 MAJOR DEPRESSIVE DISORDER, RECURRENT, MILD: ICD-10-CM

## 2023-02-27 DIAGNOSIS — E11.65 TYPE 2 DIABETES MELLITUS WITH HYPERGLYCEMIA, WITHOUT LONG-TERM CURRENT USE OF INSULIN: Primary | ICD-10-CM

## 2023-02-27 PROCEDURE — 99490 CHRNC CARE MGMT STAFF 1ST 20: CPT | Performed by: PHYSICIAN ASSISTANT

## 2023-02-27 PROCEDURE — 99439 CHRNC CARE MGMT STAF EA ADDL: CPT | Performed by: PHYSICIAN ASSISTANT

## 2023-02-27 RX ORDER — SILDENAFIL CITRATE 20 MG/1
TABLET ORAL
Qty: 30 TABLET | Refills: 0 | OUTPATIENT
Start: 2023-02-27

## 2023-02-27 RX ORDER — EMPAGLIFLOZIN 10 MG/1
TABLET, FILM COATED ORAL
Qty: 90 TABLET | Refills: 1 | OUTPATIENT
Start: 2023-02-27

## 2023-02-27 RX ORDER — LANCETS 30 GAUGE
EACH MISCELLANEOUS
Qty: 100 EACH | Refills: 3 | Status: SHIPPED | OUTPATIENT
Start: 2023-02-27

## 2023-02-27 NOTE — OUTREACH NOTE
AMBULATORY CASE MANAGEMENT NOTE    Name and Relationship of Patient/Support Person: Contreras Zechariah N - Self  Self     CCM Interim Update    Spoke with patient and discussed the purpose, goals, and expectations of the program. Reviewed possible costs with insurance (Ref#[id number]) and ability to stop program at any time. Patient consented to CCM.    Spoke to patient about hypertension management. Patient advised to check blood pressure daily and write value down for PCP review. Also encouraged patient to exercise for 30 minutes daily and increase water consumption.    Spoke to patient about diabetes management. Patient state he does not check blood sugar regularly. Advised patient to check blood sugar before each meal and before bedtime; also encouraged patient to write down values for PCP review. Patient states he does not have lancets or blood sugar test strips. Sent order request to PCP. Patient also states he has been taking antibiotics and steroids for tooth pain, so his blood sugar readings may be increased.     Social determinant questions completed. Patient states he has trouble sleeping. He states it may be contributed to pain, but he does sleep well despite efforts to improve restful environment. Patient encouraged to talk to PCP about difficulty sleeping. Appointment made.         [facility] updated and reviewed with the patient during this program:  Financial Resource Strain: Low Risk    • Difficulty of Paying Living Expenses: Not hard at all      Physical Activity: Sufficiently Active   • Days of Exercise per Week: 5 days   • Minutes of Exercise per Session: 60 min      Food Insecurity: No Food Insecurity   • Worried About Running Out of Food in the Last Year: Never true   • Ran Out of Food in the Last Year: Never true      Social Connections: Moderately Isolated   • Frequency of Communication with Friends and Family: More than three times a week   • Frequency of Social Gatherings with Friends and Family: More than  three times a week   • Attends Hoah[employer] Services: Never   • Active Member of Clubs or Organizations: No   • Attends Club or Organization Meetings: Never   • Marital Status:       Transportation Needs: No Transportation Needs   • Lack of Transportation (Medical): No   • Lack of Transportation (Non-Medical): No      Housing Stability: Low Risk    • Unable to Pay for Housing in the Last Year: No   • Number of Places Lived in the Last Year: 1   • Unstable Housing in the Last Year: No      Stress: Stress Concern Present   • Feeling of Stress : To some extent       Send Education  Questions/Answers    Flowsheet Row Most Recent Value   Other Patient Education/Resources  [address] [facility] Nurse Call Line, Advanced Care Planning   24/7 Nurse Call Line Education Method Verbal   ACP Education Method Verbal          Social Work Assessment  Questions/Answers    Flowsheet Row Most Recent Value   Current Living Arrangements home   Q1: How often do you have a drink containing alcohol? Never   Q2: How many drinks containing alcohol do you have on a typical day when you are drinking? None   Q3: How often do you have six or more drinks on one occasion? Never   Audit-C Score 0          Adult Patient Profile  Questions/Answers    Flowsheet Row Most Recent Value   Symptoms/Conditions Managed at Home diabetes, type 2, cardiovascular   Barriers to Managing Health understanding health advice   Cardiovascular Symptoms/Conditions hypertension   Cardiovascular Management Strategies medication therapy, adequate rest   Diabetes Management Strategies adequate rest, medication therapy, blood glucose testing   A1C Target 7   Last A1C Result 11.2   Identifying Health Goals keep illness under control   Q1: How often do you have a drink containing alcohol? Never   Q2: How many drinks containing alcohol do you have on a typical day when you are drinking? None   Q3: How often do you have six or more drinks on one occasion? Never   Audit-C  Score 0   Little Interest or Pleasure in Doing Things 0-->not at all   Feeling Down, Depressed or Hopeless 0-->not at all   PHQ-2 Total Score 0   PHQ-9: Brief Depression Severity Measure Score 0   Current Living Arrangements home          Education Documentation  unresolved or worsening symptoms, taught by Tisha Downs RN at 2/27/2023  3:00 PM.  Learner: Patient  Readiness: Acceptance  Method: Explanation  Response: Verbalizes Understanding    tobacco use/smoke exposure, taught by Tisha Downs RN at 2/27/2023  3:00 PM.  Learner: Patient  Readiness: Acceptance  Method: Explanation  Response: Verbalizes Understanding    safety, taught by Tisha Downs RN at 2/27/2023  3:00 PM.  Learner: Patient  Readiness: Acceptance  Method: Explanation  Response: Verbalizes Understanding    medication management, taught by Tisha Downs RN at 2/27/2023  3:00 PM.  Learner: Patient  Readiness: Acceptance  Method: Explanation  Response: Verbalizes Understanding    food/fluid intake, taught by Tisha Downs RN at 2/27/2023  3:00 PM.  Learner: Patient  Readiness: Acceptance  Method: Explanation  Response: Verbalizes Understanding    family planning overview, taught by Tisha Downs RN at 2/27/2023  3:00 PM.  Learner: Patient  Readiness: Acceptance  Method: Explanation  Response: Verbalizes Understanding    drug/alcohol use, taught by Tisha Downs RN at 2/27/2023  3:00 PM.  Learner: Patient  Readiness: Acceptance  Method: Explanation  Response: Verbalizes Understanding    coping strategies, taught by Tisha Downs RN at 2/27/2023  3:00 PM.  Learner: Patient  Readiness: Acceptance  Method: Explanation  Response: Verbalizes Understanding    activity, taught by Tisha Downs RN at 2/27/2023  3:00 PM.  Learner: Patient  Readiness: Acceptance  Method: Explanation  Response: Verbalizes Understanding    preventive care, taught by Tisha Downs RN at 2/27/2023  3:00 PM.  Learner: Patient  Readiness:  Acceptance  Method: Explanation  Response: Verbalizes Understanding    practice overview, taught by Tisha Downs RN at 2/27/2023  3:00 PM.  Learner: Patient  Readiness: Acceptance  Method: Explanation  Response: Verbalizes Understanding    patient-centered medical home, taught by Tisha Downs RN at 2/27/2023  3:00 PM.  Learner: Patient  Readiness: Acceptance  Method: Explanation  Response: Verbalizes Understanding    Unresolved/Worsening Symptoms, taught by Tisha Downs RN at 2/27/2023  3:00 PM.  Learner: Patient  Readiness: Acceptance  Method: Explanation  Response: Verbalizes Understanding    Self-Care, taught by Tisha Downs RN at 2/27/2023  3:00 PM.  Learner: Patient  Readiness: Acceptance  Method: Explanation  Response: Verbalizes Understanding    Provider Follow-Up, taught by Tisha Downs RN at 2/27/2023  3:00 PM.  Learner: Patient  Readiness: Acceptance  Method: Explanation  Response: Verbalizes Understanding    Medication Management, taught by Tisha Downs RN at 2/27/2023  3:00 PM.  Learner: Patient  Readiness: Acceptance  Method: Explanation  Response: Verbalizes Understanding    Blood Pressure Monitoring, taught by Tisha Downs RN at 2/27/2023  3:00 PM.  Learner: Patient  Readiness: Acceptance  Method: Explanation  Response: Verbalizes Understanding    Risk Factors, taught by Tisha Downs RN at 2/27/2023  3:00 PM.  Learner: Patient  Readiness: Acceptance  Method: Explanation  Response: Verbalizes Understanding    Description, taught by Tisha Downs RN at 2/27/2023  3:00 PM.  Learner: Patient  Readiness: Acceptance  Method: Explanation  Response: Verbalizes Understanding    Unresolved/Worsening Symptoms, taught by Tisha Downs RN at 2/27/2023  3:00 PM.  Learner: Patient  Readiness: Acceptance  Method: Explanation  Response: Verbalizes Understanding    Prevention of Complications, taught by Tisha Downs RN at 2/27/2023  3:00 PM.  Learner: Patient  Readiness:  Acceptance  Method: Explanation  Response: Verbalizes Understanding    Chronic Complications, taught by Tisha Downs RN at 2/27/2023  3:00 PM.  Learner: Patient  Readiness: Acceptance  Method: Explanation  Response: Verbalizes Understanding    Proper Disposal of Needles/Syringes, taught by Tisha Downs RN at 2/27/2023  3:00 PM.  Learner: Patient  Readiness: Acceptance  Method: Explanation  Response: Verbalizes Understanding    Glucagon Kit, taught by Tisha Downs RN at 2/27/2023  3:00 PM.  Learner: Patient  Readiness: Acceptance  Method: Explanation  Response: Verbalizes Understanding    Risks, taught by Tisha Downs RN at 2/27/2023  3:00 PM.  Learner: Patient  Readiness: Acceptance  Method: Explanation  Response: Verbalizes Understanding    Benefits, taught by Tisha Downs RN at 2/27/2023  3:00 PM.  Learner: Patient  Readiness: Acceptance  Method: Explanation  Response: Verbalizes Understanding    Adjustment to Diet/Medications, taught by Tisha Downs RN at 2/27/2023  3:00 PM.  Learner: Patient  Readiness: Acceptance  Method: Explanation  Response: Verbalizes Understanding    Sweeteners, taught by Tisha Downs RN at 2/27/2023  3:00 PM.  Learner: Patient  Readiness: Acceptance  Method: Explanation  Response: Verbalizes Understanding    Sick-Day Management, taught by Tisha Downs RN at 2/27/2023  3:00 PM.  Learner: Patient  Readiness: Acceptance  Method: Explanation  Response: Verbalizes Understanding    Portion Management, taught by Tisha Downs RN at 2/27/2023  3:00 PM.  Learner: Patient  Readiness: Acceptance  Method: Explanation  Response: Verbalizes Understanding    Insulin Dose Matched to Carbohydrate Intake, taught by Tisha Downs RN at 2/27/2023  3:00 PM.  Learner: Patient  Readiness: Acceptance  Method: Explanation  Response: Verbalizes Understanding    Follow-Up Care, taught by Tisha Downs RN at 2/27/2023  3:00 PM.  Learner: Patient  Readiness:  Acceptance  Method: Explanation  Response: Verbalizes Understanding    Emotions and Feelings, taught by Tisha Downs RN at 2/27/2023  3:00 PM.  Learner: Patient  Readiness: Acceptance  Method: Explanation  Response: Verbalizes Understanding    Consistent Eating Pattern, taught by Tisha Downs RN at 2/27/2023  3:00 PM.  Learner: Patient  Readiness: Acceptance  Method: Explanation  Response: Verbalizes Understanding    Carbohydrate Counting, taught by Tisha Downs RN at 2/27/2023  3:00 PM.  Learner: Patient  Readiness: Acceptance  Method: Explanation  Response: Verbalizes Understanding    Alcohol, taught by Tisha Downs RN at 2/27/2023  3:00 PM.  Learner: Patient  Readiness: Acceptance  Method: Explanation  Response: Verbalizes Understanding    Monitoring Carbohydrate Intake, taught by Tisha Downs RN at 2/27/2023  3:00 PM.  Learner: Patient  Readiness: Acceptance  Method: Explanation  Response: Verbalizes Understanding    Healthy Food Choices, taught by Tisha Downs RN at 2/27/2023  3:00 PM.  Learner: Patient  Readiness: Acceptance  Method: Explanation  Response: Verbalizes Understanding    Carbohydrate-Containing Foods, taught by Tisha Downs RN at 2/27/2023  3:00 PM.  Learner: Patient  Readiness: Acceptance  Method: Explanation  Response: Verbalizes Understanding    Hypoglycemia Identification and Treatment, taught by Tisha Downs RN at 2/27/2023  3:00 PM.  Learner: Patient  Readiness: Acceptance  Method: Explanation  Response: Verbalizes Understanding    Hyperglycemia Identification and Treatment, taught by Tisha Downs RN at 2/27/2023  3:00 PM.  Learner: Patient  Readiness: Acceptance  Method: Explanation  Response: Verbalizes Understanding    Oral Medication, taught by Tisha Downs RN at 2/27/2023  3:00 PM.  Learner: Patient  Readiness: Acceptance  Method: Explanation  Response: Verbalizes Understanding    Insulin Therapy, taught by Tisha Downs RN at 2/27/2023  3:00  PM.  Learner: Patient  Readiness: Acceptance  Method: Explanation  Response: Verbalizes Understanding    Importance of Glycemic Management, taught by Tisha Downs RN at 2/27/2023  3:00 PM.  Learner: Patient  Readiness: Acceptance  Method: Explanation  Response: Verbalizes Understanding    Blood Glucose Monitor Use, taught by Tisha Downs RN at 2/27/2023  3:00 PM.  Learner: Patient  Readiness: Acceptance  Method: Explanation  Response: Verbalizes Understanding    Blood Glucose Monitoring, taught by Tisha Downs RN at 2/27/2023  3:00 PM.  Learner: Patient  Readiness: Acceptance  Method: Explanation  Response: Verbalizes Understanding    Blood Glucose Goal, taught by Tisha Downs RN at 2/27/2023  3:00 PM.  Learner: Patient  Readiness: Acceptance  Method: Explanation  Response: Verbalizes Understanding    A1C Goal, taught by Tisha Downs RN at 2/27/2023  3:00 PM.  Learner: Patient  Readiness: Acceptance  Method: Explanation  Response: Verbalizes Understanding    Frequency of Testing, taught by Tisha Downs RN at 2/27/2023  3:00 PM.  Learner: Patient  Readiness: Acceptance  Method: Explanation  Response: Verbalizes Understanding    Definition, taught by Tisha Downs RN at 2/27/2023  3:00 PM.  Learner: Patient  Readiness: Acceptance  Method: Explanation  Response: Verbalizes Understanding    Diagnosis Criteria, taught by Tisha Downs RN at 2/27/2023  3:00 PM.  Learner: Patient  Readiness: Acceptance  Method: Explanation  Response: Verbalizes Understanding    Diabetes, Type 2, taught by Tisha Downs RN at 2/27/2023  3:00 PM.  Learner: Patient  Readiness: Acceptance  Method: Explanation  Response: Verbalizes Understanding          Tisha FLORES  Ambulatory Case Management    2/27/2023, 15:01 EST

## 2023-02-27 NOTE — OUTREACH NOTE
[facility] End of Month Documentation    This Chronic Medical Management Care Plan for Zechariah Contreras, 62 y.o. male, has been a new plan of care implemented; established and a new plan of care implemented for the month of February.  A cumulative time of 69  minutes was spent on this patient record this month, including face to face with provider; phone call with patient; chart review.    Regarding the patient's problems: has Hypertension, essential; Hyperlipidemia, mixed; Major depressive disorder, recurrent, mild (HCC); Anxiety; Type 2 diabetes mellitus with hyperglycemia, without long-term current use of insulin (HCC); and Restless leg syndrome on their problem list., the following items were addressed: medical records; medications and any changes can be found within the plan section of the note.  A detailed listing of time spent for chronic care management is tracked within each outreach encounter.  Current medications include:  has a current medication list which includes the following prescription(s): atorvastatin, baclofen, buspirone, duloxetine, empagliflozin, esomeprazole, fluticasone, gemfibrozil, onetouch ultrasoft, metoprolol succinate xl, nifedipine cc, onetouch ultra, quinapril, ropinirole, and sildenafil. and the patient is reported to be patient is compliant with medication protocol,  Medications are reported to be effective.    All notes on chart for PCP to review.    The patient was monitored remotely for blood pressure; blood glucose.    The patient's physical needs include:  help taking medications as prescribed; physical healthcare.     The patient's mental support needs include:  continued support    The patient's cognitive support needs include:  continued support    The patient's psychosocial support needs include:  continued support; medication management or adherence    The patient's functional needs include: physical healthcare    The patient's environmental needs include:  not applicable    Care  Plan overall comments:  No data recorded    Refer to previous outreach notes for more information on the areas listed above.    Monthly Billing Diagnoses  (E11.65) Type 2 diabetes mellitus with hyperglycemia, without long-term current use of insulin (HCC)    (F33.0) Major depressive disorder, recurrent, mild (HCC)    Medications   · Medications have been reconciled    Care Plan progress this month:      Recently Modified Care Plans Updates made since 1/27/2023 12:00 AM     Diabetes Type 2 (Adult)         Problem Priority Last Modified     Glycemic Management (Diabetes, Type 2) --  2/27/2023  2:50 PM by Tisha Downs, RN              Goal Recent Progress Last Modified     Glycemic Management Optimized --  2/27/2023  2:50 PM by Tisha Downs, RN     Evidence-based guidance:   Anticipate A1C testing (point-of-care) every 3 to 6 months based on goal attainment.   Review mutually-set A1C goal or target range.   Anticipate use of antihyperglycemic with or without insulin and periodic adjustments; consider active involvement of pharmacist.   Provide medical nutrition therapy and development of individualized eating.   Compare self-reported symptoms of hypo or hyperglycemia to blood glucose levels, diet and fluid intake, current medications, psychosocial and physiologic stressors, change in activity and barriers to care adherence.   Promote self-monitoring of blood glucose levels.   Assess and address barriers to management plan, such as food insecurity, age, developmental ability, depression, anxiety, fear of hypoglycemia or weight gain, as well as medication cost, side effects and complicated regimen.   Consider referral to community-based diabetes education program, visiting nurse, community health worker or health .   Encourage regular dental care for treatment of periodontal disease; refer to dental provider when needed.    Notes:            Task Due Date Last Modified     Alleviate Barriers to Glycemic  Management 5/29/2023 2/27/2023  2:53 PM by Tisha Downs RN     Care Management Activities:      - barriers to adherence to treatment plan identified  - blood glucose monitoring encouraged  - mutual A1C goal set or reviewed  - use of blood glucose monitoring log promoted      Notes:              Problem Priority Last Modified     Disease Progression (Diabetes, Type 2) --  2/27/2023  2:50 PM by Tisha Downs RN              Goal Recent Progress Last Modified     Disease Progression Prevented or Minimized --  2/27/2023  2:50 PM by Tisha Downs RN     Evidence-based guidance:   Prepare patient for laboratory and diagnostic exams based on risk and presentation.   Encourage lifestyle changes, such as increased intake of plant-based foods, stress reduction, consistent physical activity and smoking cessation to prevent long-term complications and chronic disease.    Individualize activity and exercise recommendations while considering potential limitations, such as neuropathy, retinopathy or the ability to prevent hyperglycemia or hypoglycemia.    Prepare patient for use of pharmacologic therapy that may include antihypertensive, analgesic, prostaglandin E1 with periodic adjustments, based on presenting chronic condition and laboratory results.   Assess signs/symptoms and risk factors for hypertension, sleep-disordered breathing, neuropathy (including changes in gait and balance), retinopathy, nephropathy and sexual dysfunction.   Address pregnancy planning and contraceptive choice, especially when prescribing antihypertensive or statin.   Ensure completion of annual comprehensive foot exam and dilated eye exam.    Implement additional individualized goals and interventions based on identified risk factors.   Prepare patient for consultation or referral for specialist care, such as ophthalmology, neurology, cardiology, podiatry, nephrology or perinatology.    Notes:            Task Due Date Last Modified      "Monitor and Manage Follow-up for Comorbidities 5/29/2023 2/27/2023  2:56 PM by Tisha Downs RN     Care Management Activities:      - activity based on tolerance and functional limitations encouraged  - healthy lifestyle promoted  - quality of sleep assessed      Notes:                 Hypertension (Adult)         Problem Priority Last Modified     Hypertension (Hypertension) --  2/27/2023  2:50 PM by Tisha Downs RN              Goal Recent Progress Last Modified     Hypertension Monitored --  2/27/2023  2:50 PM by Tisha Downs RN     Evidence-based guidance:   Promote initial use of ambulatory blood pressure measurements (for 3 days) to rule out \"white-coat\" effect; identify masked hypertension and presence or absence of nocturnal \"dipping\" of blood pressure.    Encourage continued use of home blood pressure monitoring and recording in blood pressure log; include symptoms of hypotension or potential medication side effects in log.   Review blood pressure measurements taken inside and outside of the provider office; establish baseline and monitor trends; compare to target ranges or patient goal.   Share overall cardiovascular risk with patient; encourage changes to lifestyle risk factors, including alcohol consumption, smoking, inadequate exercise, poor dietary habits and stress.    Notes:            Task Due Date Last Modified     Identify and Monitor Blood Pressure Elevation 5/29/2023 2/27/2023  2:56 PM by Tisha Downs RN     Care Management Activities:      - home or ambulatory blood pressure monitoring encouraged      Notes:              Problem Priority Last Modified     Disease Progression (Hypertension) --  2/27/2023  2:50 PM by Tisha Downs RN              Goal Recent Progress Last Modified     Disease Progression Prevented or Minimized --  2/27/2023  2:50 PM by Tisha Downs RN     Evidence-based guidance:   Tailor lifestyle advice to individual; review progress regularly; give " frequent encouragement and respond positively to incremental successes.   Assess for and promote awareness of worsening disease or development of comorbidity.   Prepare patient for laboratory and diagnostic exams based on risk and presentation.   Prepare patient for use of pharmacologic therapy that may include diuretic, beta-blocker, beta-blocker/thiazide combination, angiotensin-converting enzyme inhibitor, renin-angiotensin blocker or calcium-channel blocker.   Expect periodic adjustments to pharmacologic therapy; manage side effects.   Promote a healthy diet that includes primarily plant-based foods, such as fruits, vegetables, whole grains, beans and legumes, low-fat dairy and lean meats.    Consider moderate reduction in sodium intake by avoiding the addition of salt to prepared foods and limiting processed meats, canned soup, frozen meals and salty snacks.    Promote a regular, daily exercise goal of 150 minutes per week of moderate exercise based on tolerance, ability and patient choice; consider referral to physical therapist, community wellness and/or activity program.   Encourage the avoidance of no more than 2 hours per day of sedentary activity, such as recreational screen time.   Review sources of stress; explore current coping strategies and encourage use of mindfulness, yoga, meditation or exercise to manage stress.    Notes:            Task Due Date Last Modified     Alleviate Barriers to Hypertension Treatment 5/29/2023 2/27/2023  2:57 PM by Tisha Downs RN     Care Management Activities:      - healthy diet promoted  - healthy family lifestyle promoted  - quality of sleep assessed  - reduction in sedentary activities encouraged      Notes:              Problem Priority Last Modified     Resistant Hypertension (Hypertension) --  2/27/2023  2:50 PM by Tisha Downs RN              Goal Recent Progress Last Modified     Response to Treatment Maximized --  2/27/2023  2:50 PM by Tisha Downs  CINDY RN     Evidence-based guidance:   Assess patient response to treatment, including presence or absence of medication side effects, degree of blood pressure control and patient satisfaction.   Assess technique (including cuff size and placement), measurement times, condition and calibration of blood pressure cuff set (both at-home and in-office equipment).   Assess factors that may influence response to treatment, including nonadherence to pharmacologic treatment plan, diet or activity changes and/or presence of pain, stress or sleep disturbance.   Screen for signs and symptoms of depression; if present, refer for or complete a comprehensive assessment.   Evaluate social and economic barriers that may affect adherence to treatment plan   Address pharmacologic nonadherence by simplifying dosing regimen, counseling or support by pharmacist, financial assistance, self-monitoring of blood pressure, use of motivational interviewing, voice or text messages.   Encourage behavioral adherence strategies, like habit-based interventions that link medication taking with existing daily routines.   Assess barriers to regular, daily physical activity; support family or support person-oriented activity changes and utilization of community activity or sports program.   Address barriers to dietary changes, especially sodium restriction, with referrals to community programs, like cooking classes, meal services or intensive education when available.   Refer to community-based peer support program or nurse home-visiting program.   Assess for chronic pain; when present add additional goals (Chronic Pain Care Plan Guide) as needed.   Provide frequent follow-up by telephone, telemonitoring, patient-practice portal or with home visit.   Review alcohol use screen; address using brief intervention beginning with risk that interferes with blood pressure control; refer for treatment when excessive alcohol use is noted.   Screen for obstructive  sleep apnea; prepare patient for polysomnography based on risk and presentation and use of noninvasive ventilation to relieve obstructive sleep apnea when present.    Notes:            Task Due Date Last Modified     Facilitate Adherence to Lifestyle Change 5/29/2023 2/27/2023  2:57 PM by Tisha Downs RN     Care Management Activities:      - barriers to lifestyle change identified  - resources needed to manage barriers to lifestyle change identified  - support and encouragement provided      Notes:                      · Current Specialty Plan of Care Status signed by patient    Instructions   · Patient was provided an electronic copy of care plan  · CCM services were explained and offered and patient has accepted these services.  · Patient has given their written consent to receive CCM services and understands that this includes the authorization of electronic communication of medical information with the other treating providers.  · Patient understands that they may stop CCM services at any time and these changes will be effective at the end of the calendar month and will effectively revocate the agreement of CCM services.  · Patient understands that only one practitioner can furnish and be paid for CCM services during one calendar month.  Patient also understands that there may be co-payment or deductible fees in association with CCM services.  · Patient will continue with at least monthly follow-up calls with the Ambulatory .    Tisha FLORES  Ambulatory Case Management    2/27/2023, 15:18 EST

## 2023-03-03 ENCOUNTER — TELEMEDICINE (OUTPATIENT)
Dept: FAMILY MEDICINE CLINIC | Facility: CLINIC | Age: 63
End: 2023-03-03
Payer: MEDICAID

## 2023-03-03 DIAGNOSIS — E11.65 TYPE 2 DIABETES MELLITUS WITH HYPERGLYCEMIA, WITHOUT LONG-TERM CURRENT USE OF INSULIN: Primary | ICD-10-CM

## 2023-03-03 DIAGNOSIS — Z86.79 HISTORY OF SUBDURAL HEMATOMA: ICD-10-CM

## 2023-03-03 DIAGNOSIS — I10 HYPERTENSION, ESSENTIAL: ICD-10-CM

## 2023-03-03 PROCEDURE — 99214 OFFICE O/P EST MOD 30 MIN: CPT | Performed by: PHYSICIAN ASSISTANT

## 2023-03-03 RX ORDER — CYCLOBENZAPRINE HCL 10 MG
10 TABLET ORAL
COMMUNITY

## 2023-03-03 RX ORDER — BACLOFEN 20 MG/1
20 TABLET ORAL 3 TIMES DAILY
COMMUNITY

## 2023-03-03 NOTE — PROGRESS NOTES
Chief Complaint  Diabetes    Subjective         Zechariah Contreras presents to [facility]  History of Present Illness  Visit done through [email or web address] , unable to access [employer]. Visit done with patient permission.  He is in today to follow up from previous hospital visit. He was admitted to  on 1/29/2023 and discharged on 1/30/2023. He was diagnosed with a subdural hematoma following a fall. He states source of fall was not known and he did not remember the events of that day. He was advised to fup with us within one week and then with surgery team if symptoms persisted. . He states has been doing well. He admits has not been monitoring his sugar levels- has f/up with endocrinology in 2 weeks. He denies any chest pain or shortness of breath. Admits does not check his bp often .  Diabetes  He presents for his follow-up diabetic visit. He has type 2 diabetes mellitus. Pertinent negatives for hypoglycemia include no dizziness or headaches. Pertinent negatives for diabetes include no blurred vision, no chest pain, no fatigue, no polydipsia, no polyphagia and no polyuria.   Hypertension  This is a chronic problem. The problem is controlled. Pertinent negatives include no blurred vision, chest pain, headaches, peripheral edema or shortness of breath.       Review of Systems   Constitutional: Negative for fatigue and fever.   HENT: Negative for congestion and sore throat.    Eyes: Negative for blurred vision.   Respiratory: Negative for shortness of breath.    Cardiovascular: Negative for chest pain.   Gastrointestinal: Negative for abdominal pain, diarrhea, nausea and vomiting.   Endocrine: Negative for polydipsia, polyphagia and polyuria.   Neurological: Negative for dizziness and headache.     Objective   Vital Signs:   There were no vitals taken for this visit.    There is no height or weight on file to calculate BMI.     Physical Exam   Constitutional: He appears well-developed and  well-nourished.   Pulmonary/Chest: Effort normal.   Psychiatric: He has a normal mood and affect.     Result Review :                 Assessment and Plan    Diagnoses and all orders for this visit:    1. Type 2 diabetes mellitus with hyperglycemia, without long-term current use of insulin (HCC) (Primary)  Discussed limitation of virtual visit. Discussed the importance of monitoring sugar levels, working on healthy diet and exercise and keeping f/up with endocrinology.RTC prior to appt if needed.   2. Hypertension, essential  Encouraged patient to monitor bp . He denies any chest pain or shortness of breath. Denies any headache, dizziness or falls. RTC prior to routine recheck visit as needed.   3. History of subdural hematoma.   He denies any headaches, dizziness, or falls since discharge from hospital- was recommended to f/up with surgical team if any symptoms persisted and he states did not have to f/up with them. Monitor for any symptom return.         Follow Up   No follow-ups on file.  Patient was given instructions and counseling regarding his condition or for health maintenance advice. Please see specific information pulled into the AVS if appropriate.     Mode of Visit: Video  Location of patient: home  Location of provider[place] [facility] [place]  You have chosen to receive care through a telehealth visit.  Does the patient consent to use a video/audio connection for your medical care today? Yes  The visit included audio and video interaction. No technical issues occurred during this visit.

## 2023-03-08 RX ORDER — CYCLOBENZAPRINE HCL 10 MG
10 TABLET ORAL
OUTPATIENT
Start: 2023-03-08

## 2023-03-08 NOTE — TELEPHONE ENCOUNTER
Cyclobenzaprine is not one that we have sent for him-- last muscle relaxer we have filled was baclofen so not sure when it was switched or by whom; thanks

## 2023-03-08 NOTE — TELEPHONE ENCOUNTER
Caller: ESTEPHANIA PETERSEN    Relationship: Emergency Contact    Best call back number: [phone]    Requested Prescriptions:   Requested Prescriptions     Pending Prescriptions Disp Refills   • cyclobenzaprine (FLEXERIL) 10 MG tablet       Sig: Take 1 tablet by mouth.      Pharmacy where request should be sent: [facility] [id number] [address] - [phone]  - [phone] FX     Additional details provided by patient:     CALLER ADVISED PATIENT IS COMPLETELY OUT OF THE MEDICATION    Does the patient have less than a 3 day supply:  [x] Yes  [] No    Would you like a call back once the refill request has been completed: [x] Yes [] No    If the office needs to give you a call back, can they leave a voicemail: [x] Yes [] No    Birdie Galeana Rep   03/08/23 13:29 EST

## 2023-03-15 ENCOUNTER — TELEPHONE (OUTPATIENT)
Dept: FAMILY MEDICINE CLINIC | Facility: CLINIC | Age: 63
End: 2023-03-15
Payer: MEDICAID

## 2023-03-15 NOTE — TELEPHONE ENCOUNTER
Patient is unsure where the flexeril came from but states it helps more than the baclofen. He had a telemed with you on 3/3/23.

## 2023-03-15 NOTE — TELEPHONE ENCOUNTER
Called pharmacy they said script was from 1/2022 from ER for a short fill aware not to take them with the baclofen and told pt if back not better we can get in with Ortho.

## 2023-03-15 NOTE — TELEPHONE ENCOUNTER
"HUB TO READ  \"Cyclobenzaprine is not one that we have sent for him-- last muscle relaxer we have filled was baclofen so not sure when it was switched or by whom; thanks  "

## 2023-03-22 RX ORDER — EMPAGLIFLOZIN 25 MG/1
TABLET, FILM COATED ORAL
Qty: 30 TABLET | Refills: 0 | Status: SHIPPED | OUTPATIENT
Start: 2023-03-22

## 2023-03-22 NOTE — TELEPHONE ENCOUNTER
I got a notice today he no showed for his endocrinology appt- please call  Patient as he needs to call and reschedule- sent x med x 1; thanks

## 2023-03-24 ENCOUNTER — TELEPHONE (OUTPATIENT)
Dept: CASE MANAGEMENT | Facility: OTHER | Age: 63
End: 2023-03-24
Payer: MEDICAID

## 2023-03-27 ENCOUNTER — TELEPHONE (OUTPATIENT)
Dept: CASE MANAGEMENT | Facility: OTHER | Age: 63
End: 2023-03-27
Payer: MEDICAID

## 2023-03-27 ENCOUNTER — PATIENT OUTREACH (OUTPATIENT)
Dept: CASE MANAGEMENT | Facility: OTHER | Age: 63
End: 2023-03-27
Payer: MEDICAID

## 2023-03-27 DIAGNOSIS — E11.65 TYPE 2 DIABETES MELLITUS WITH HYPERGLYCEMIA, WITHOUT LONG-TERM CURRENT USE OF INSULIN: Primary | ICD-10-CM

## 2023-03-27 DIAGNOSIS — F33.0 MAJOR DEPRESSIVE DISORDER, RECURRENT, MILD: ICD-10-CM

## 2023-03-27 NOTE — TELEPHONE ENCOUNTER
Spoke to patient's wife. She states patient is still experiencing back and shoulder pain. They are interested in neurosurgery and orthopedic referral. Do you care to place referrals?   Also patient blood sugar dropped to 41 in the past few weeks. Would you care to place order for Gvoke HypoPen- glucagon injection?

## 2023-03-29 DIAGNOSIS — G89.29 CHRONIC BILATERAL LOW BACK PAIN, UNSPECIFIED WHETHER SCIATICA PRESENT: Primary | ICD-10-CM

## 2023-03-29 DIAGNOSIS — M54.50 CHRONIC BILATERAL LOW BACK PAIN, UNSPECIFIED WHETHER SCIATICA PRESENT: Primary | ICD-10-CM

## 2023-03-29 NOTE — TELEPHONE ENCOUNTER
Attempted to contact, left voicemail    Hub to read:   Put in referral for orthopedist for evaluation on back. Had put in previous msg-  But got a notice that he had not showed for his endocrinology appt- please check to see if that was rescheduled-- also please check on sugar levels as his were staying elevated- has he changed his medication or gone elsewhere for diabetes mgt as would need to update his chart; thanks

## 2023-03-29 NOTE — TELEPHONE ENCOUNTER
Hub to read: I got a notice today he no showed for his endocrinology appt- please call  Patient as he needs to call and reschedule- sent x med x 1; thanks

## 2023-03-29 NOTE — TELEPHONE ENCOUNTER
Put in referral for orthopedist for evaluation on back. Had put in previous msg-  But got a notice that he had not showed for his endocrinology appt- please check to see if that was rescheduled-- also please check on sugar levels as his were staying elevated- has he changed his medication or gone elsewhere for diabetes mgt as would need to update his chart; thanks

## 2023-03-30 ENCOUNTER — PATIENT OUTREACH (OUTPATIENT)
Dept: CASE MANAGEMENT | Facility: OTHER | Age: 63
End: 2023-03-30
Payer: MEDICAID

## 2023-03-30 DIAGNOSIS — F33.0 MAJOR DEPRESSIVE DISORDER, RECURRENT, MILD: ICD-10-CM

## 2023-03-30 DIAGNOSIS — E11.65 TYPE 2 DIABETES MELLITUS WITH HYPERGLYCEMIA, WITHOUT LONG-TERM CURRENT USE OF INSULIN: Primary | ICD-10-CM

## 2023-03-30 NOTE — OUTREACH NOTE
[facility] End of Month Documentation    This Chronic Medical Management Care Plan for Zechariah Contreras, 62 y.o. male, has been monitored and managed and a new plan of care implemented for the month of March.  A cumulative time of 73  minutes was spent on this patient record this month, including chart review; phone call with relative; phone call with pharmacist; electronic communication with primary care provider.    Regarding the patient's problems: has Hypertension, essential; Hyperlipidemia, mixed; Major depressive disorder, recurrent, mild (HCC); Anxiety; Type 2 diabetes mellitus with hyperglycemia, without long-term current use of insulin (HCC); and Restless leg syndrome on their problem list., the following items were addressed: medical records; medications and any changes can be found within the plan section of the note.  A detailed listing of time spent for chronic care management is tracked within each outreach encounter.  Current medications include:  has a current medication list which includes the following prescription(s): atorvastatin, baclofen, buspirone, cyclobenzaprine, duloxetine, esomeprazole, fluticasone, gemfibrozil, glucose blood, jardiance, lancets, metoprolol succinate xl, nifedipine cc, quinapril, ropinirole, and sildenafil. and the patient is reported to be patient is compliant with medication protocol,  Medications are reported to be effective.  All notes on chart for PCP to review.    The patient was monitored remotely for blood pressure; blood glucose.    The patient's physical needs include:  physical healthcare.     The patient's mental support needs include:  continued support    The patient's cognitive support needs include:  continued support    The patient's psychosocial support needs include:  continued support; medication management or adherence    The patient's functional needs include: physical healthcare    The patient's environmental needs include:  not applicable    Care Plan  overall comments:  No data recorded    Refer to previous outreach notes for more information on the areas listed above.    Monthly Billing Diagnoses  (E11.65) Type 2 diabetes mellitus with hyperglycemia, without long-term current use of insulin (HCC)    (F33.0) Major depressive disorder, recurrent, mild (HCC)    Medications   · Medications have been reconciled    Care Plan progress this month:      Recently Modified Care Plans Updates made since 2/27/2023 12:00 AM     Diabetes Type 2 (Adult)         Problem Priority Last Modified     Glycemic Management (Diabetes, Type 2) --  2/27/2023  2:50 PM by Tisha Downs, RN              Goal Recent Progress Last Modified     Glycemic Management Optimized --  2/27/2023  2:50 PM by Tisha Downs, RN     Evidence-based guidance:   Anticipate A1C testing (point-of-care) every 3 to 6 months based on goal attainment.   Review mutually-set A1C goal or target range.   Anticipate use of antihyperglycemic with or without insulin and periodic adjustments; consider active involvement of pharmacist.   Provide medical nutrition therapy and development of individualized eating.   Compare self-reported symptoms of hypo or hyperglycemia to blood glucose levels, diet and fluid intake, current medications, psychosocial and physiologic stressors, change in activity and barriers to care adherence.   Promote self-monitoring of blood glucose levels.   Assess and address barriers to management plan, such as food insecurity, age, developmental ability, depression, anxiety, fear of hypoglycemia or weight gain, as well as medication cost, side effects and complicated regimen.   Consider referral to community-based diabetes education program, visiting nurse, community health worker or health .   Encourage regular dental care for treatment of periodontal disease; refer to dental provider when needed.    Notes:            Task Due Date Last Modified     Alleviate Barriers to Glycemic Management  5/29/2023 2/27/2023  2:53 PM by Tisha Downs RN     Care Management Activities:      - barriers to adherence to treatment plan identified  - blood glucose monitoring encouraged  - mutual A1C goal set or reviewed  - use of blood glucose monitoring log promoted      Notes:              Problem Priority Last Modified     Disease Progression (Diabetes, Type 2) --  2/27/2023  2:50 PM by Tisha Downs RN              Goal Recent Progress Last Modified     Disease Progression Prevented or Minimized --  2/27/2023  2:50 PM by Tisha Downs RN     Evidence-based guidance:   Prepare patient for laboratory and diagnostic exams based on risk and presentation.   Encourage lifestyle changes, such as increased intake of plant-based foods, stress reduction, consistent physical activity and smoking cessation to prevent long-term complications and chronic disease.    Individualize activity and exercise recommendations while considering potential limitations, such as neuropathy, retinopathy or the ability to prevent hyperglycemia or hypoglycemia.    Prepare patient for use of pharmacologic therapy that may include antihypertensive, analgesic, prostaglandin E1 with periodic adjustments, based on presenting chronic condition and laboratory results.   Assess signs/symptoms and risk factors for hypertension, sleep-disordered breathing, neuropathy (including changes in gait and balance), retinopathy, nephropathy and sexual dysfunction.   Address pregnancy planning and contraceptive choice, especially when prescribing antihypertensive or statin.   Ensure completion of annual comprehensive foot exam and dilated eye exam.    Implement additional individualized goals and interventions based on identified risk factors.   Prepare patient for consultation or referral for specialist care, such as ophthalmology, neurology, cardiology, podiatry, nephrology or perinatology.    Notes:            Task Due Date Last Modified     Monitor and  "Manage Follow-up for Comorbidities 5/29/2023 2/27/2023  2:56 PM by Tisha Downs RN     Care Management Activities:      - activity based on tolerance and functional limitations encouraged  - healthy lifestyle promoted  - quality of sleep assessed      Notes:                 Hypertension (Adult)         Problem Priority Last Modified     Hypertension (Hypertension) --  2/27/2023  2:50 PM by Tisha Downs RN              Goal Recent Progress Last Modified     Hypertension Monitored --  2/27/2023  2:50 PM by Tisha Downs RN     Evidence-based guidance:   Promote initial use of ambulatory blood pressure measurements (for 3 days) to rule out \"white-coat\" effect; identify masked hypertension and presence or absence of nocturnal \"dipping\" of blood pressure.    Encourage continued use of home blood pressure monitoring and recording in blood pressure log; include symptoms of hypotension or potential medication side effects in log.   Review blood pressure measurements taken inside and outside of the provider office; establish baseline and monitor trends; compare to target ranges or patient goal.   Share overall cardiovascular risk with patient; encourage changes to lifestyle risk factors, including alcohol consumption, smoking, inadequate exercise, poor dietary habits and stress.    Notes:            Task Due Date Last Modified     Identify and Monitor Blood Pressure Elevation 5/29/2023 2/27/2023  2:56 PM by Tisha Downs RN     Care Management Activities:      - home or ambulatory blood pressure monitoring encouraged      Notes:              Problem Priority Last Modified     Disease Progression (Hypertension) --  2/27/2023  2:50 PM by Tisha Downs RN              Goal Recent Progress Last Modified     Disease Progression Prevented or Minimized --  2/27/2023  2:50 PM by Tisha Downs RN     Evidence-based guidance:   Tailor lifestyle advice to individual; review progress regularly; give frequent " encouragement and respond positively to incremental successes.   Assess for and promote awareness of worsening disease or development of comorbidity.   Prepare patient for laboratory and diagnostic exams based on risk and presentation.   Prepare patient for use of pharmacologic therapy that may include diuretic, beta-blocker, beta-blocker/thiazide combination, angiotensin-converting enzyme inhibitor, renin-angiotensin blocker or calcium-channel blocker.   Expect periodic adjustments to pharmacologic therapy; manage side effects.   Promote a healthy diet that includes primarily plant-based foods, such as fruits, vegetables, whole grains, beans and legumes, low-fat dairy and lean meats.    Consider moderate reduction in sodium intake by avoiding the addition of salt to prepared foods and limiting processed meats, canned soup, frozen meals and salty snacks.    Promote a regular, daily exercise goal of 150 minutes per week of moderate exercise based on tolerance, ability and patient choice; consider referral to physical therapist, community wellness and/or activity program.   Encourage the avoidance of no more than 2 hours per day of sedentary activity, such as recreational screen time.   Review sources of stress; explore current coping strategies and encourage use of mindfulness, yoga, meditation or exercise to manage stress.    Notes:            Task Due Date Last Modified     Alleviate Barriers to Hypertension Treatment 5/29/2023 2/27/2023  2:57 PM by Tisha Downs RN     Care Management Activities:      - healthy diet promoted  - healthy family lifestyle promoted  - quality of sleep assessed  - reduction in sedentary activities encouraged      Notes:              Problem Priority Last Modified     Resistant Hypertension (Hypertension) --  2/27/2023  2:50 PM by Tisha Downs RN              Goal Recent Progress Last Modified     Response to Treatment Maximized --  2/27/2023  2:50 PM by Tisha Downs RN      Evidence-based guidance:   Assess patient response to treatment, including presence or absence of medication side effects, degree of blood pressure control and patient satisfaction.   Assess technique (including cuff size and placement), measurement times, condition and calibration of blood pressure cuff set (both at-home and in-office equipment).   Assess factors that may influence response to treatment, including nonadherence to pharmacologic treatment plan, diet or activity changes and/or presence of pain, stress or sleep disturbance.   Screen for signs and symptoms of depression; if present, refer for or complete a comprehensive assessment.   Evaluate social and economic barriers that may affect adherence to treatment plan   Address pharmacologic nonadherence by simplifying dosing regimen, counseling or support by pharmacist, financial assistance, self-monitoring of blood pressure, use of motivational interviewing, voice or text messages.   Encourage behavioral adherence strategies, like habit-based interventions that link medication taking with existing daily routines.   Assess barriers to regular, daily physical activity; support family or support person-oriented activity changes and utilization of community activity or sports program.   Address barriers to dietary changes, especially sodium restriction, with referrals to community programs, like cooking classes, meal services or intensive education when available.   Refer to community-based peer support program or nurse home-visiting program.   Assess for chronic pain; when present add additional goals (Chronic Pain Care Plan Guide) as needed.   Provide frequent follow-up by telephone, telemonitoring, patient-practice portal or with home visit.   Review alcohol use screen; address using brief intervention beginning with risk that interferes with blood pressure control; refer for treatment when excessive alcohol use is noted.   Screen for obstructive sleep  apnea; prepare patient for polysomnography based on risk and presentation and use of noninvasive ventilation to relieve obstructive sleep apnea when present.    Notes:            Task Due Date Last Modified     Facilitate Adherence to Lifestyle Change 5/29/2023 2/27/2023  2:57 PM by Tisha Downs RN     Care Management Activities:      - barriers to lifestyle change identified  - resources needed to manage barriers to lifestyle change identified  - support and encouragement provided      Notes:                      Instructions   · Patient was provided an electronic copy of care plan  · CCM services were explained and offered and patient has accepted these services.  · Patient has given their written consent to receive CCM services and understands that this includes the authorization of electronic communication of medical information with the other treating providers.  · Patient understands that they may stop CCM services at any time and these changes will be effective at the end of the calendar month and will effectively revocate the agreement of CCM services.  · Patient understands that only one practitioner can furnish and be paid for CCM services during one calendar month.  Patient also understands that there may be co-payment or deductible fees in association with CCM services.  · Patient will continue with at least monthly follow-up calls with the Ambulatory .    Tisha FLORES  Ambulatory Case Management    3/30/2023, 09:08 EDT

## 2023-04-11 ENCOUNTER — TELEPHONE (OUTPATIENT)
Dept: FAMILY MEDICINE CLINIC | Facility: CLINIC | Age: 63
End: 2023-04-11
Payer: MEDICAID

## 2023-04-11 RX ORDER — SILDENAFIL CITRATE 20 MG/1
TABLET ORAL
Qty: 30 TABLET | Refills: 0 | Status: SHIPPED | OUTPATIENT
Start: 2023-04-11

## 2023-04-11 NOTE — TELEPHONE ENCOUNTER
Caller: ESTEPHANIA PETERSEN    Relationship: Emergency Contact    Best call back number: [phone]    Requested Prescriptions:   Requested Prescriptions     Pending Prescriptions Disp Refills   • sildenafil (REVATIO) 20 MG tablet 30 tablet 0     Sig: TAKE 2 TO 5 TABLETS BY MOUTH 30 MINUTES PRIOR TO INTERCOURSE AS NEEDED        Pharmacy where request should be sent: [facility] [id number] [address] - [phone]  - [phone] FX     Last office visit with prescribing clinician: Visit date not found   Last telemedicine visit with prescribing clinician: Visit date not found   Next office visit with prescribing clinician: Visit date not foun    Does the patient have less than a 3 day supply:  [x] Yes  [] No    Would you like a call back once the refill request has been completed: [] Yes [x] No    If the office needs to give you a call back, can they leave a voicemail: [] Yes [x] No    Birdie [employer]   04/11/23 12:36 EDT

## 2023-04-11 NOTE — TELEPHONE ENCOUNTER
Caller: ESTEPHANIA PETERSEN    Relationship: Emergency Contact    Best call back number: [phone]    What is the best time to reach you: AS SOON AS POSSIBLE    Who are you requesting to speak with (clinical staff, provider,  specific staff member): CLINICAL     Do you know the name of the person who called: ANNIE    What was the call regarding: MRS PETERSEN CALLED TO FOLLOW UP ON STATUS OF THE PA FOR JARDIANCE.  STATED SHE HAS CALLED 4 TIMES AND [facility] HAS EMAILED OFFICE FOR THE STATUS.    [facility] [id number] - [place] - [address] - [phone]  - [phone]   [phone]    Do you require a callback: YES

## 2023-04-11 NOTE — TELEPHONE ENCOUNTER
I do not see that he rescheduled with endocrinologist after he missed his appt we scheduled-- he has uncontrolled diabetes and needs to get in with them for evaluation -- please see if he needs us to make this appt for him-- thanks

## 2023-04-12 ENCOUNTER — TELEPHONE (OUTPATIENT)
Dept: FAMILY MEDICINE CLINIC | Facility: CLINIC | Age: 63
End: 2023-04-12

## 2023-04-12 NOTE — TELEPHONE ENCOUNTER
Called pharmacy let know PA was approved gave info to pt wife. Also let pt wife know he needs to reschedule endo appt wife said she will call back for number the number is [phone]

## 2023-04-12 NOTE — TELEPHONE ENCOUNTER
ANUSHA FROM [facility] ADVISED SHE NEEDS A DIAGNOSIS CODE FOR JARDIANCE.

## 2023-04-14 RX ORDER — SILDENAFIL CITRATE 20 MG/1
TABLET ORAL
Qty: 30 TABLET | Refills: 0 | OUTPATIENT
Start: 2023-04-14

## 2023-04-25 ENCOUNTER — TELEPHONE (OUTPATIENT)
Dept: CASE MANAGEMENT | Facility: OTHER | Age: 63
End: 2023-04-25
Payer: MEDICAID

## 2023-04-25 ENCOUNTER — PATIENT OUTREACH (OUTPATIENT)
Dept: CASE MANAGEMENT | Facility: OTHER | Age: 63
End: 2023-04-25
Payer: MEDICAID

## 2023-04-25 DIAGNOSIS — F33.0 MAJOR DEPRESSIVE DISORDER, RECURRENT, MILD: ICD-10-CM

## 2023-04-25 DIAGNOSIS — E11.65 TYPE 2 DIABETES MELLITUS WITH HYPERGLYCEMIA, WITHOUT LONG-TERM CURRENT USE OF INSULIN: Primary | ICD-10-CM

## 2023-04-25 RX ORDER — SILDENAFIL CITRATE 20 MG/1
TABLET ORAL
Qty: 30 TABLET | Refills: 0 | OUTPATIENT
Start: 2023-04-25

## 2023-04-25 RX ORDER — EMPAGLIFLOZIN 25 MG/1
TABLET, FILM COATED ORAL
Qty: 30 TABLET | Refills: 0 | Status: SHIPPED | OUTPATIENT
Start: 2023-04-25

## 2023-04-25 NOTE — TELEPHONE ENCOUNTER
No refill on flexeril- he takes baclofen- please keep f/up with ortho and can evaluate if other medication preferred; thanks

## 2023-04-25 NOTE — OUTREACH NOTE
AMBULATORY CASE MANAGEMENT NOTE    Name and Relationship of Patient/Support Person: TRINITYESTEPHANIA - Emergency Contact    [facility] Interim Update    Spoke to patient's wife about diabetes management. She states patient was able to  Jardiance. He has been taking medication daily. She reports his blood sugar ranges between 140 to 180. He has had a few readings around 214. She states patient does not check blood sugar daily. Encouraged patient to continue to check blood sugars daily and write down values for MD review. Wife states patient has not had any low readings since the last incident. Patient made appointment with endocrinologist in August. Patient would prefer a sooner appointment to discuss random hypoglycemia incidents. Called Dr. Rosenstein's office to place patient on wait list for sooner appointment. Encouraged wife to contact AC if blood sugars are less than 75 or greater than 200.    Spoke to wife about hypertension management. Wife states patient has been checking bp, but she does not have list accessible. She reports patient's bp remains within normal range. Educated wife to contact AC if systolic bp is greater than 160 or less than 90.    Discussed patient's back pain. Wife reports she has not received call from orthopedic office to schedule appointment. Provider number to wife. She states patient needs refill of Flexeril. Sent order request to provider.         Education Documentation  No documentation found.        Tisha FLORES  Ambulatory Case Management    4/25/2023, 13:48 EDT

## 2023-04-27 ENCOUNTER — PATIENT OUTREACH (OUTPATIENT)
Dept: CASE MANAGEMENT | Facility: OTHER | Age: 63
End: 2023-04-27
Payer: MEDICAID

## 2023-04-27 DIAGNOSIS — E11.65 TYPE 2 DIABETES MELLITUS WITH HYPERGLYCEMIA, WITHOUT LONG-TERM CURRENT USE OF INSULIN: Primary | ICD-10-CM

## 2023-04-27 DIAGNOSIS — F33.0 MAJOR DEPRESSIVE DISORDER, RECURRENT, MILD: ICD-10-CM

## 2023-04-27 PROCEDURE — 99490 CHRNC CARE MGMT STAFF 1ST 20: CPT | Performed by: PHYSICIAN ASSISTANT

## 2023-04-27 PROCEDURE — 99439 CHRNC CARE MGMT STAF EA ADDL: CPT | Performed by: PHYSICIAN ASSISTANT

## 2023-04-27 NOTE — OUTREACH NOTE
[facility] End of Month Documentation    This Chronic Medical Management Care Plan for Zechariah Contreras, 63 y.o. male, has been monitored and managed and a new plan of care implemented for the month of April.  A cumulative time of 49  minutes was spent on this patient record this month, including chart review; phone call with relative; phone call with other providers.    Regarding the patient's problems: has Hypertension, essential; Hyperlipidemia, mixed; Major depressive disorder, recurrent, mild; Anxiety; Type 2 diabetes mellitus with hyperglycemia, without long-term current use of insulin; and Restless leg syndrome on their problem list., the following items were addressed: medical records; medications and any changes can be found within the plan section of the note.  A detailed listing of time spent for chronic care management is tracked within each outreach encounter.  Current medications include:  has a current medication list which includes the following prescription(s): atorvastatin, baclofen, buspirone, cyclobenzaprine, duloxetine, esomeprazole, fluticasone, gemfibrozil, glucose blood, jardiance, lancets, metoprolol succinate xl, nifedipine cc, quinapril, ropinirole, and sildenafil. and the patient is reported to be patient is compliant with medication protocol,  Medications are reported to be effective.   All notes on chart for PCP to review.    The patient was monitored remotely for blood pressure; blood glucose.    The patient's physical needs include:  physical healthcare.     The patient's mental support needs include:  continued support    The patient's cognitive support needs include:  continued support    The patient's psychosocial support needs include:  continued support; medication management or adherence    The patient's functional needs include: physical healthcare    The patient's environmental needs include:  not applicable    Care Plan overall comments:  No data recorded    Refer to previous  outreach notes for more information on the areas listed above.    Monthly Billing Diagnoses  (E11.65) Type 2 diabetes mellitus with hyperglycemia, without long-term current use of insulin    (F33.0) Major depressive disorder, recurrent, mild    Medications   · Medications have been reconciled    Care Plan progress this month:      Recently Modified Care Plans Updates made since 3/27/2023 12:00 AM    No recently modified care plans.            Instructions   · Patient was provided an electronic copy of care plan  · CCM services were explained and offered and patient has accepted these services.  · Patient has given their written consent to receive CCM services and understands that this includes the authorization of electronic communication of medical information with the other treating providers.  · Patient understands that they may stop CCM services at any time and these changes will be effective at the end of the calendar month and will effectively revocate the agreement of CCM services.  · Patient understands that only one practitioner can furnish and be paid for CCM services during one calendar month.  Patient also understands that there may be co-payment or deductible fees in association with CCM services.  · Patient will continue with at least monthly follow-up calls with the Ambulatory .    Tisha FLORES  Ambulatory Case Management    4/27/2023, 15:53 EDT

## 2023-05-11 RX ORDER — BACLOFEN 20 MG/1
TABLET ORAL
Qty: 270 TABLET | OUTPATIENT
Start: 2023-05-11

## 2023-05-23 RX ORDER — CYCLOBENZAPRINE HCL 10 MG
10 TABLET ORAL
Status: CANCELLED | OUTPATIENT
Start: 2023-05-23

## 2023-05-23 RX ORDER — BACLOFEN 20 MG/1
20 TABLET ORAL 3 TIMES DAILY
Status: CANCELLED | OUTPATIENT
Start: 2023-05-23

## 2023-05-23 NOTE — TELEPHONE ENCOUNTER
Caller: ESTEPHANIA PETERSEN    Relationship: Emergency Contact    Best call back number: [phone]     Requested Prescriptions:   Requested Prescriptions     Pending Prescriptions Disp Refills   • baclofen (LIORESAL) 20 MG tablet       Sig: Take 1 tablet by mouth 3 (Three) Times a Day.   • fluticasone (Flonase) 50 MCG/ACT nasal spray 48 g 0     Si sprays each nostril once a day   • empagliflozin (Jardiance) 25 MG tablet tablet 30 tablet 0     Sig: Take 1 tablet by mouth Daily.   • sildenafil (REVATIO) 20 MG tablet 30 tablet 0     Sig: TAKE 2 TO 5 TABLETS BY MOUTH 30 MINUTES PRIOR TO INTERCOURSE AS NEEDED   • atorvastatin (LIPITOR) 40 MG tablet 90 tablet 1     Sig: Take one pill by oral route once a day.   • gemfibrozil (LOPID) 600 MG tablet 180 tablet 1     Sig: Take one tablet by mouth twice a day 30 minutes before morning and evening meals.   • rOPINIRole (REQUIP) 1 MG tablet 90 tablet 1     Sig: Take 1 tablet by mouth Every Night.   • busPIRone (BUSPAR) 10 MG tablet 180 tablet 1     Sig: Take 1 tablet by mouth 2 (Two) Times a Day.   • metoprolol succinate XL (TOPROL-XL) 200 MG 24 hr tablet 90 tablet 1     Sig: Take 1 tablet by mouth Daily.   • NIFEdipine CC (ADALAT CC) 30 MG 24 hr tablet 90 tablet 1     Sig: Take 1 tablet by mouth Daily.   • esomeprazole (nexIUM) 40 MG capsule 90 capsule 1     Sig: Take 1 capsule by mouth Daily.   • quinapril (ACCUPRIL) 20 MG tablet 60 tablet 1     Sig: Take 2 tablets by mouth Daily.   • DULoxetine (CYMBALTA) 60 MG capsule 90 capsule 1     Sig: Take 1 capsule by mouth Daily.   • cyclobenzaprine (FLEXERIL) 10 MG tablet       Sig: Take 1 tablet by mouth.        Pharmacy where request should be sent: [facility] [id number] - [place] [facility] [address] - [phone] [facility] [phone] FX     Last office visit with prescribing clinician: Visit date not found   Last telemedicine visit with prescribing clinician: 3/3/2023   Next office visit with prescribing  clinician: Visit date not found     Additional details provided by patient: PATIENTS WIFE STATES HE HAS 2 DAYS LEFT OF MEDICATION BACLOFEN AND IS REQUESTING REFILLS ON THE REST OF HIS MEDICATIONS.     Birdie Martines Rep   05/23/23 16:31 EDT

## 2023-05-24 ENCOUNTER — TELEPHONE (OUTPATIENT)
Dept: FAMILY MEDICINE CLINIC | Facility: CLINIC | Age: 63
End: 2023-05-24
Payer: MEDICAID

## 2023-05-24 ENCOUNTER — TELEPHONE (OUTPATIENT)
Dept: CASE MANAGEMENT | Facility: OTHER | Age: 63
End: 2023-05-24
Payer: MEDICAID

## 2023-05-24 RX ORDER — ESOMEPRAZOLE MAGNESIUM 40 MG/1
40 CAPSULE, DELAYED RELEASE ORAL DAILY
Qty: 30 CAPSULE | Refills: 0 | Status: SHIPPED | OUTPATIENT
Start: 2023-05-24

## 2023-05-24 RX ORDER — FLUTICASONE PROPIONATE 50 MCG
SPRAY, SUSPENSION (ML) NASAL
Qty: 48 G | Refills: 0 | Status: SHIPPED | OUTPATIENT
Start: 2023-05-24

## 2023-05-24 RX ORDER — NIFEDIPINE 30 MG/1
30 TABLET, FILM COATED, EXTENDED RELEASE ORAL DAILY
Qty: 30 TABLET | Refills: 0 | Status: SHIPPED | OUTPATIENT
Start: 2023-05-24

## 2023-05-24 RX ORDER — GEMFIBROZIL 600 MG/1
TABLET, FILM COATED ORAL
Qty: 60 TABLET | Refills: 0 | Status: SHIPPED | OUTPATIENT
Start: 2023-05-24

## 2023-05-24 RX ORDER — SILDENAFIL CITRATE 20 MG/1
TABLET ORAL
Qty: 30 TABLET | Refills: 0 | Status: SHIPPED | OUTPATIENT
Start: 2023-05-24

## 2023-05-24 RX ORDER — BUSPIRONE HYDROCHLORIDE 10 MG/1
10 TABLET ORAL 2 TIMES DAILY
Qty: 60 TABLET | Refills: 0 | Status: SHIPPED | OUTPATIENT
Start: 2023-05-24

## 2023-05-24 RX ORDER — QUINAPRIL 20 MG/1
40 TABLET ORAL DAILY
Qty: 60 TABLET | Refills: 0 | Status: SHIPPED | OUTPATIENT
Start: 2023-05-24

## 2023-05-24 RX ORDER — DULOXETIN HYDROCHLORIDE 60 MG/1
60 CAPSULE, DELAYED RELEASE ORAL DAILY
Qty: 30 CAPSULE | Refills: 0 | Status: SHIPPED | OUTPATIENT
Start: 2023-05-24

## 2023-05-24 RX ORDER — METOPROLOL SUCCINATE 200 MG/1
200 TABLET, EXTENDED RELEASE ORAL DAILY
Qty: 30 TABLET | Refills: 0 | Status: SHIPPED | OUTPATIENT
Start: 2023-05-24

## 2023-05-24 RX ORDER — ATORVASTATIN CALCIUM 40 MG/1
TABLET, FILM COATED ORAL
Qty: 30 TABLET | Refills: 0 | Status: SHIPPED | OUTPATIENT
Start: 2023-05-24

## 2023-05-24 RX ORDER — BACLOFEN 20 MG/1
TABLET ORAL
Qty: 270 TABLET | OUTPATIENT
Start: 2023-05-24

## 2023-05-24 RX ORDER — ROPINIROLE 1 MG/1
1 TABLET, FILM COATED ORAL NIGHTLY
Qty: 30 TABLET | Refills: 0 | Status: SHIPPED | OUTPATIENT
Start: 2023-05-24

## 2023-05-24 RX ORDER — BACLOFEN 20 MG/1
20 TABLET ORAL 3 TIMES DAILY
Qty: 90 TABLET | Refills: 0 | Status: SHIPPED | OUTPATIENT
Start: 2023-05-24

## 2023-05-24 NOTE — TELEPHONE ENCOUNTER
Pharmacy Name: [facility] [id number] [facility] [address] [facility]  - [phone] [facility] [phone]      Pharmacy representative name:CASSANDRA     Pharmacy representative phone number: [phone]    What medication are you calling in regards to: quinapril (ACCUPRIL) 20 MG tablet         What question does the pharmacy have: NO LONGER AVAILABLE FOR AT LAST ONE OR TWO YEARS AND REQUESTING WHATEVER ALTERNATE THE PROVIDER ADVISES     Who is the provider that prescribed the medication:MART IBARRA    Additional notes: PLEASE CALL AND ADVISE

## 2023-05-25 ENCOUNTER — TELEPHONE (OUTPATIENT)
Dept: FAMILY MEDICINE CLINIC | Facility: CLINIC | Age: 63
End: 2023-05-25

## 2023-05-25 NOTE — TELEPHONE ENCOUNTER
HUB TO READ: LVM, PT NEEDS TO SCHEDULE IN PERSON VISIT FOR FUTURE REFILLS, PLEASE SCHEDULE WITH JINNY IBARRA

## 2023-05-26 ENCOUNTER — TELEPHONE (OUTPATIENT)
Dept: CASE MANAGEMENT | Facility: OTHER | Age: 63
End: 2023-05-26
Payer: MEDICAID

## 2023-05-31 ENCOUNTER — TELEPHONE (OUTPATIENT)
Dept: FAMILY MEDICINE CLINIC | Facility: CLINIC | Age: 63
End: 2023-05-31

## 2023-05-31 NOTE — TELEPHONE ENCOUNTER
Pharmacy Name: [facility] [id number] [facility] [address] [facility] [address] - [phone]  - [phone]      Pharmacy representative name: LORENA    Pharmacy representative phone number: [phone]    What medication are you calling in regards to: quinapril (ACCUPRIL) 20 MG tablet    What question does the pharmacy have: IS THERE ANOTHER PRESCRIPTION THAT THE DR CAN ORDER?    Who is the provider that prescribed the medication: MART IBARRA    Additional notes: PLEASE ADVISE

## 2023-06-01 RX ORDER — LISINOPRIL 40 MG/1
40 TABLET ORAL DAILY
Qty: 30 TABLET | Refills: 0 | Status: SHIPPED | OUTPATIENT
Start: 2023-06-01

## 2023-06-01 NOTE — TELEPHONE ENCOUNTER
Please let patient know I sent in lisinopril 40 mg to replace the quinapril since it is on backorder; should be similar in strength but please monitor bp to make sure staying well controlled; thanks

## 2023-06-16 ENCOUNTER — TELEPHONE (OUTPATIENT)
Dept: CASE MANAGEMENT | Facility: OTHER | Age: 63
End: 2023-06-16
Payer: MEDICAID

## 2023-07-28 RX ORDER — METOPROLOL SUCCINATE 200 MG/1
200 TABLET, EXTENDED RELEASE ORAL DAILY
Qty: 90 TABLET | Refills: 0 | Status: CANCELLED | OUTPATIENT
Start: 2023-07-28

## 2023-07-28 RX ORDER — LISINOPRIL 40 MG/1
40 TABLET ORAL DAILY
Qty: 90 TABLET | Refills: 0 | Status: CANCELLED | OUTPATIENT
Start: 2023-07-28

## 2023-08-03 ENCOUNTER — TELEPHONE (OUTPATIENT)
Dept: ENDOCRINOLOGY | Facility: CLINIC | Age: 63
End: 2023-08-03
Payer: MEDICAID

## 2023-08-03 ENCOUNTER — OFFICE VISIT (OUTPATIENT)
Dept: ENDOCRINOLOGY | Facility: CLINIC | Age: 63
End: 2023-08-03
Payer: MEDICAID

## 2023-08-03 VITALS
HEART RATE: 68 BPM | SYSTOLIC BLOOD PRESSURE: 120 MMHG | HEIGHT: 74 IN | WEIGHT: 268 LBS | DIASTOLIC BLOOD PRESSURE: 60 MMHG | BODY MASS INDEX: 34.39 KG/M2

## 2023-08-03 DIAGNOSIS — E78.2 HYPERLIPIDEMIA, MIXED: ICD-10-CM

## 2023-08-03 DIAGNOSIS — M19.90 ARTHRITIS: ICD-10-CM

## 2023-08-03 DIAGNOSIS — E11.65 TYPE 2 DIABETES MELLITUS WITH HYPERGLYCEMIA, WITHOUT LONG-TERM CURRENT USE OF INSULIN: Primary | ICD-10-CM

## 2023-08-03 LAB
ALBUMIN UR-MCNC: 10.8 MG/DL
CHOLEST SERPL-MCNC: 189 MG/DL (ref 0–200)
CREAT UR-MCNC: 56.5 MG/DL
EXPIRATION DATE: ABNORMAL
EXPIRATION DATE: NORMAL
GLUCOSE BLDC GLUCOMTR-MCNC: 139 MG/DL (ref 70–130)
HBA1C MFR BLD: 7.9 %
HDLC SERPL-MCNC: 40 MG/DL (ref 40–60)
LDLC SERPL CALC-MCNC: 98 MG/DL (ref 0–100)
LDLC/HDLC SERPL: 2.21 {RATIO}
Lab: ABNORMAL
Lab: NORMAL
MICROALBUMIN/CREAT UR: 191.2 MG/G
TRIGL SERPL-MCNC: 303 MG/DL (ref 0–150)
VLDLC SERPL-MCNC: 51 MG/DL (ref 5–40)

## 2023-08-03 PROCEDURE — 82043 UR ALBUMIN QUANTITATIVE: CPT | Performed by: HOSPITALIST

## 2023-08-03 PROCEDURE — 80061 LIPID PANEL: CPT | Performed by: HOSPITALIST

## 2023-08-03 PROCEDURE — 82570 ASSAY OF URINE CREATININE: CPT | Performed by: HOSPITALIST

## 2023-08-03 RX ORDER — BLOOD-GLUCOSE METER
KIT MISCELLANEOUS
Qty: 1 EACH | Refills: 0 | Status: SHIPPED | OUTPATIENT
Start: 2023-08-03

## 2023-08-03 RX ORDER — ATORVASTATIN CALCIUM 40 MG/1
TABLET, FILM COATED ORAL
Qty: 90 TABLET | Refills: 1 | Status: SHIPPED | OUTPATIENT
Start: 2023-08-03

## 2023-08-03 RX ORDER — LANCETS 30 GAUGE
EACH MISCELLANEOUS
Qty: 100 EACH | Refills: 3 | Status: SHIPPED | OUTPATIENT
Start: 2023-08-03

## 2023-08-03 RX ORDER — MELOXICAM 15 MG/1
TABLET ORAL
Qty: 14 TABLET | Refills: 0 | Status: SHIPPED | OUTPATIENT
Start: 2023-08-03

## 2023-08-03 RX ORDER — FENOFIBRATE 145 MG/1
TABLET, COATED ORAL
Qty: 30 TABLET | Refills: 5 | Status: SHIPPED | OUTPATIENT
Start: 2023-08-03

## 2023-08-03 RX ORDER — SEMAGLUTIDE 0.68 MG/ML
0.25 INJECTION, SOLUTION SUBCUTANEOUS WEEKLY
Qty: 3 ML | Refills: 1 | Status: SHIPPED | OUTPATIENT
Start: 2023-08-03

## 2023-08-07 ENCOUNTER — PRIOR AUTHORIZATION (OUTPATIENT)
Dept: ENDOCRINOLOGY | Facility: CLINIC | Age: 63
End: 2023-08-07
Payer: MEDICAID

## 2023-08-07 NOTE — TELEPHONE ENCOUNTER
PA submitted via cover my meds     he authorization is effective from 08/07/2023 to 08/06/2024, as long as the member is enrolled in their current health plan.

## 2023-08-16 RX ORDER — SILDENAFIL CITRATE 20 MG/1
TABLET ORAL
Qty: 30 TABLET | Refills: 0 | Status: SHIPPED | OUTPATIENT
Start: 2023-08-16

## 2023-08-17 ENCOUNTER — TELEPHONE (OUTPATIENT)
Dept: CASE MANAGEMENT | Facility: OTHER | Age: 63
End: 2023-08-17
Payer: MEDICAID

## 2023-08-21 ENCOUNTER — TELEPHONE (OUTPATIENT)
Dept: FAMILY MEDICINE CLINIC | Facility: CLINIC | Age: 63
End: 2023-08-21
Payer: MEDICAID

## 2023-08-21 NOTE — TELEPHONE ENCOUNTER
Spoke with wife, Mily, answered questions.  Recommended telehealth visit through [facility] with PCP, Ms. Divya Elise to make most appropriate recommendations regarding previous use of diltiazem and needed replacement. As patient will eventually be returning to [place] and will be staying with same PCP upon return.

## 2023-08-21 NOTE — TELEPHONE ENCOUNTER
Patients wife is calling stating that they are out of town and would like to know if Adelaida can call something in for blood pressure.    Return call number is [phone]

## 2023-08-22 NOTE — TELEPHONE ENCOUNTER
Caller: ESTEPHANIA PETERSEN    Relationship: Emergency Contact    Best call back number: [phone]     Requested Prescriptions:   Requested Prescriptions     Pending Prescriptions Disp Refills    lisinopril (PRINIVIL,ZESTRIL) 40 MG tablet 90 tablet 0     Sig: Take 1 tablet by mouth Daily.    esomeprazole (nexIUM) 40 MG capsule 90 capsule 0     Sig: Take 1 capsule by mouth Daily.    DULoxetine (CYMBALTA) 60 MG capsule 90 capsule 0     Sig: Take 1 capsule by mouth Daily.        Pharmacy where request should be sent: [facility] [id number] [address] - [phone]  - [phone] FX     Last office visit with prescribing clinician: Visit date not found   Last telemedicine visit with prescribing clinician: 6/28/2023   Next office visit with prescribing clinician: Visit date not found       Does the patient have less than a 3 day supply:  [x] Yes  [] No    Would you like a call back once the refill request has been completed: [] Yes [x] No    If the office needs to give you a call back, can they leave a voicemail: [] Yes [x] No    Birdie Garcia Rep   08/22/23 11:55 EDT

## 2023-08-24 RX ORDER — LISINOPRIL 40 MG/1
40 TABLET ORAL DAILY
Qty: 90 TABLET | Refills: 0 | OUTPATIENT
Start: 2023-08-24

## 2023-08-24 RX ORDER — DULOXETIN HYDROCHLORIDE 60 MG/1
60 CAPSULE, DELAYED RELEASE ORAL DAILY
Qty: 90 CAPSULE | Refills: 0 | OUTPATIENT
Start: 2023-08-24

## 2023-08-24 RX ORDER — ESOMEPRAZOLE MAGNESIUM 40 MG/1
40 CAPSULE, DELAYED RELEASE ORAL DAILY
Qty: 90 CAPSULE | Refills: 0 | OUTPATIENT
Start: 2023-08-24

## 2023-08-25 NOTE — TELEPHONE ENCOUNTER
Caller: ESTEPHANIA PETERSEN    Relationship: Emergency Contact    Best call back number: [phone]     Requested Prescriptions:   Requested Prescriptions     Pending Prescriptions Disp Refills    sildenafil (REVATIO) 20 MG tablet 30 tablet 0     Sig: TAKE TWO TO FIVE TABLETS BY MOUTH 30 MINUTES PRIOR TO INTERCOURSE AS NEEDED    fluticasone (Flonase) 50 MCG/ACT nasal spray 48 g 0     Si sprays each nostril once a day    lisinopril (PRINIVIL,ZESTRIL) 40 MG tablet 90 tablet 0     Sig: Take 1 tablet by mouth Daily.    DULoxetine (CYMBALTA) 60 MG capsule 90 capsule 0     Sig: Take 1 capsule by mouth Daily.    esomeprazole (nexIUM) 40 MG capsule 90 capsule 0     Sig: Take 1 capsule by mouth Daily.        Pharmacy where request should be sent: [facility] [id number] [place] [id number] AT [facility] [address]  [phone] [facility] [phone] FX     Last office visit with prescribing clinician: Visit date not found   Last telemedicine visit with prescribing clinician: 2023   Next office visit with prescribing clinician: Visit date not found     Additional details provided by patient: OUT OF MEDICATION     Does the patient have less than a 3 day supply:  [x] Yes  [] No    Would you like a call back once the refill request has been completed: [] Yes [x] No    If the office needs to give you a call back, can they leave a voicemail: [] Yes [x] No    Birdie Taylor   23 14:50 EDT

## 2023-08-28 ENCOUNTER — TELEPHONE (OUTPATIENT)
Dept: ENDOCRINOLOGY | Facility: CLINIC | Age: 63
End: 2023-08-28

## 2023-08-28 NOTE — TELEPHONE ENCOUNTER
PATIENTS WIFE IS CALLING STATING PATIENT IS STARTING TAKING OZEMPIC BUT IS NOT SURE IF PATIENT IS SUPPOSE TO CONTINUE JARDIANCE. PHONE NUMBER [phone]

## 2023-08-29 RX ORDER — ESOMEPRAZOLE MAGNESIUM 40 MG/1
40 CAPSULE, DELAYED RELEASE ORAL DAILY
Qty: 90 CAPSULE | Refills: 0 | OUTPATIENT
Start: 2023-08-29

## 2023-08-29 RX ORDER — DULOXETIN HYDROCHLORIDE 60 MG/1
60 CAPSULE, DELAYED RELEASE ORAL DAILY
Qty: 90 CAPSULE | Refills: 0 | OUTPATIENT
Start: 2023-08-29

## 2023-08-29 RX ORDER — SILDENAFIL CITRATE 20 MG/1
TABLET ORAL
Qty: 30 TABLET | Refills: 0 | OUTPATIENT
Start: 2023-08-29

## 2023-08-29 RX ORDER — FLUTICASONE PROPIONATE 50 MCG
SPRAY, SUSPENSION (ML) NASAL
Qty: 48 G | Refills: 0 | Status: SHIPPED | OUTPATIENT
Start: 2023-08-29

## 2023-08-29 RX ORDER — LISINOPRIL 40 MG/1
40 TABLET ORAL DAILY
Qty: 90 TABLET | Refills: 0 | OUTPATIENT
Start: 2023-08-29

## 2023-08-29 NOTE — TELEPHONE ENCOUNTER
Rx Refill Note  Requested Prescriptions     Pending Prescriptions Disp Refills    sildenafil (REVATIO) 20 MG tablet 30 tablet 0     Sig: TAKE TWO TO FIVE TABLETS BY MOUTH 30 MINUTES PRIOR TO INTERCOURSE AS NEEDED    fluticasone (Flonase) 50 MCG/ACT nasal spray 48 g 0     Si sprays each nostril once a day    lisinopril (PRINIVIL,ZESTRIL) 40 MG tablet 90 tablet 0     Sig: Take 1 tablet by mouth Daily.    DULoxetine (CYMBALTA) 60 MG capsule 90 capsule 0     Sig: Take 1 capsule by mouth Daily.    esomeprazole (nexIUM) 40 MG capsule 90 capsule 0     Sig: Take 1 capsule by mouth Daily.      Last office visit with prescribing clinician: Visit date not found   Last telemedicine visit with prescribing clinician: 2023   Next office visit with prescribing clinician: Visit date not found                         Would you like a call back once the refill request has been completed: [] Yes [] No    If the office needs to give you a call back, can they leave a voicemail: [] Yes [] No    Jacquelyn Willams MA  23, 13:33 EDT

## 2023-08-29 NOTE — TELEPHONE ENCOUNTER
Sent flonase-- too soon on other meds-- should be good until end of September-- he is due for in office med recheck prior to that ; thanks

## 2023-08-29 NOTE — TELEPHONE ENCOUNTER
Pt wife contacted said they are still in [place] and dont know when they will be able to have an appt here. Suggested to wife they should get established with a PCP in [place] because he needs an in office appt. Pt wife said okay.

## 2023-09-18 ENCOUNTER — PATIENT OUTREACH (OUTPATIENT)
Dept: CASE MANAGEMENT | Facility: OTHER | Age: 63
End: 2023-09-18
Payer: MEDICAID

## 2023-09-18 DIAGNOSIS — E11.65 TYPE 2 DIABETES MELLITUS WITH HYPERGLYCEMIA, WITHOUT LONG-TERM CURRENT USE OF INSULIN: Primary | ICD-10-CM

## 2023-09-18 DIAGNOSIS — F33.0 MAJOR DEPRESSIVE DISORDER, RECURRENT, MILD: ICD-10-CM

## 2023-09-18 NOTE — OUTREACH NOTE
AMBULATORY CASE MANAGEMENT NOTE    Name and Relationship of Patient/Support Person:  -     CCM Interim Update    Spoke to patient's wife briefly. She states she is busy with work, but she will call ACM back momentarily.         Education Documentation  No documentation found.        Tisha FLORES  Ambulatory Case Management    9/18/2023, 14:14 EDT

## 2023-09-20 ENCOUNTER — OFFICE VISIT (OUTPATIENT)
Dept: FAMILY MEDICINE CLINIC | Facility: CLINIC | Age: 63
End: 2023-09-20
Payer: MEDICAID

## 2023-09-20 ENCOUNTER — PATIENT OUTREACH (OUTPATIENT)
Dept: CASE MANAGEMENT | Facility: OTHER | Age: 63
End: 2023-09-20
Payer: MEDICAID

## 2023-09-20 VITALS
OXYGEN SATURATION: 97 % | SYSTOLIC BLOOD PRESSURE: 130 MMHG | WEIGHT: 258 LBS | BODY MASS INDEX: 33.11 KG/M2 | HEART RATE: 80 BPM | DIASTOLIC BLOOD PRESSURE: 90 MMHG | TEMPERATURE: 97.7 F | HEIGHT: 74 IN

## 2023-09-20 DIAGNOSIS — E78.2 HYPERLIPIDEMIA, MIXED: Chronic | ICD-10-CM

## 2023-09-20 DIAGNOSIS — N52.9 ERECTILE DYSFUNCTION, UNSPECIFIED ERECTILE DYSFUNCTION TYPE: Chronic | ICD-10-CM

## 2023-09-20 DIAGNOSIS — F33.0 MAJOR DEPRESSIVE DISORDER, RECURRENT, MILD: ICD-10-CM

## 2023-09-20 DIAGNOSIS — F41.9 ANXIETY: Chronic | ICD-10-CM

## 2023-09-20 DIAGNOSIS — E11.65 TYPE 2 DIABETES MELLITUS WITH HYPERGLYCEMIA, WITHOUT LONG-TERM CURRENT USE OF INSULIN: Chronic | ICD-10-CM

## 2023-09-20 DIAGNOSIS — E11.65 TYPE 2 DIABETES MELLITUS WITH HYPERGLYCEMIA, WITHOUT LONG-TERM CURRENT USE OF INSULIN: Primary | ICD-10-CM

## 2023-09-20 DIAGNOSIS — I10 HYPERTENSION, ESSENTIAL: Primary | Chronic | ICD-10-CM

## 2023-09-20 DIAGNOSIS — K21.9 GASTROESOPHAGEAL REFLUX DISEASE, UNSPECIFIED WHETHER ESOPHAGITIS PRESENT: Chronic | ICD-10-CM

## 2023-09-20 DIAGNOSIS — J30.9 ALLERGIC RHINITIS, UNSPECIFIED SEASONALITY, UNSPECIFIED TRIGGER: Chronic | ICD-10-CM

## 2023-09-20 DIAGNOSIS — F33.1 MAJOR DEPRESSIVE DISORDER, RECURRENT, MODERATE: Chronic | ICD-10-CM

## 2023-09-20 DIAGNOSIS — G25.81 RESTLESS LEG SYNDROME: Chronic | ICD-10-CM

## 2023-09-20 DIAGNOSIS — M54.50 CHRONIC BILATERAL LOW BACK PAIN, UNSPECIFIED WHETHER SCIATICA PRESENT: Chronic | ICD-10-CM

## 2023-09-20 DIAGNOSIS — G89.29 CHRONIC BILATERAL LOW BACK PAIN, UNSPECIFIED WHETHER SCIATICA PRESENT: Chronic | ICD-10-CM

## 2023-09-20 PROCEDURE — 3051F HG A1C>EQUAL 7.0%<8.0%: CPT | Performed by: NURSE PRACTITIONER

## 2023-09-20 PROCEDURE — 99214 OFFICE O/P EST MOD 30 MIN: CPT | Performed by: NURSE PRACTITIONER

## 2023-09-20 PROCEDURE — 3075F SYST BP GE 130 - 139MM HG: CPT | Performed by: NURSE PRACTITIONER

## 2023-09-20 PROCEDURE — 1159F MED LIST DOCD IN RCRD: CPT | Performed by: NURSE PRACTITIONER

## 2023-09-20 PROCEDURE — 1160F RVW MEDS BY RX/DR IN RCRD: CPT | Performed by: NURSE PRACTITIONER

## 2023-09-20 PROCEDURE — 3080F DIAST BP >= 90 MM HG: CPT | Performed by: NURSE PRACTITIONER

## 2023-09-20 RX ORDER — ATORVASTATIN CALCIUM 40 MG/1
TABLET, FILM COATED ORAL
Qty: 90 TABLET | Refills: 1 | Status: CANCELLED | OUTPATIENT
Start: 2023-09-20

## 2023-09-20 RX ORDER — ESOMEPRAZOLE MAGNESIUM 40 MG/1
40 CAPSULE, DELAYED RELEASE ORAL DAILY
Qty: 90 CAPSULE | Refills: 1 | Status: SHIPPED | OUTPATIENT
Start: 2023-09-20

## 2023-09-20 RX ORDER — DULOXETIN HYDROCHLORIDE 60 MG/1
60 CAPSULE, DELAYED RELEASE ORAL DAILY
Qty: 90 CAPSULE | Refills: 1 | Status: SHIPPED | OUTPATIENT
Start: 2023-09-20

## 2023-09-20 RX ORDER — METOPROLOL SUCCINATE 200 MG/1
200 TABLET, EXTENDED RELEASE ORAL DAILY
Qty: 90 TABLET | Refills: 1 | Status: SHIPPED | OUTPATIENT
Start: 2023-09-20

## 2023-09-20 RX ORDER — NIFEDIPINE 30 MG/1
30 TABLET, FILM COATED, EXTENDED RELEASE ORAL DAILY
Qty: 90 TABLET | Refills: 1 | Status: SHIPPED | OUTPATIENT
Start: 2023-09-20

## 2023-09-20 RX ORDER — LISINOPRIL 40 MG/1
40 TABLET ORAL DAILY
Qty: 90 TABLET | Refills: 1 | Status: SHIPPED | OUTPATIENT
Start: 2023-09-20

## 2023-09-20 RX ORDER — FENOFIBRATE 145 MG/1
TABLET, COATED ORAL
Qty: 30 TABLET | Refills: 5 | Status: CANCELLED | OUTPATIENT
Start: 2023-09-20

## 2023-09-20 RX ORDER — ROPINIROLE 1 MG/1
1 TABLET, FILM COATED ORAL NIGHTLY
Qty: 90 TABLET | Refills: 1 | Status: SHIPPED | OUTPATIENT
Start: 2023-09-20

## 2023-09-20 RX ORDER — SILDENAFIL CITRATE 20 MG/1
TABLET ORAL
Qty: 30 TABLET | Refills: 1 | Status: SHIPPED | OUTPATIENT
Start: 2023-09-20

## 2023-09-20 RX ORDER — BACLOFEN 20 MG/1
20 TABLET ORAL 3 TIMES DAILY
Qty: 270 TABLET | Refills: 1 | Status: SHIPPED | OUTPATIENT
Start: 2023-09-20

## 2023-09-20 RX ORDER — BUSPIRONE HYDROCHLORIDE 10 MG/1
10 TABLET ORAL 2 TIMES DAILY
Qty: 180 TABLET | Refills: 1 | Status: SHIPPED | OUTPATIENT
Start: 2023-09-20

## 2023-09-20 RX ORDER — FLUTICASONE PROPIONATE 50 MCG
SPRAY, SUSPENSION (ML) NASAL
Qty: 48 G | Refills: 1 | Status: SHIPPED | OUTPATIENT
Start: 2023-09-20

## 2023-09-20 NOTE — PROGRESS NOTES
"Chief Complaint  Med Refill    Subjective          Zechariah Contreras presents to [facility] [place]    History of Present Illness  [facility]      PCP: Divya Elise PA-C    CC: \"med refills\"    Patient is here temporarily caring for his mother. Will be here at least until after the first of the year.  Has done telehealth visits with PCP in [place], but was requiring in office visit prior to additional refills.  Labs were ordered by PCP that he has not had done yet.  Recently got established with endocrinology for his diabetes and was started on Ozempic, continued on Jardiance and continued with atorvastatin and added fenofibrate--these meds were refilled by endocrinology.  Also sees pain management for oxycodone Rx--seen for chronic low back pain, neck, shoulder pain.  Reports hx of back surgery and rotator cuff surgery.  Prashant review shows he has also been prescribed Belbuca (buprenophine) at times with last Rx fill on 8-8-2023.  Last oxycodone fill was 9-7-2023.     Has multiple co-morbidities including HTN, DM, HLD, GERD, Depression/anxiety, RLS, Erectile dyfunction; chronic pain, allergic rhinitis.     Reports he is doing well on current medications without complaints. No acute problems/concerns voiced today.       Review of Systems   Constitutional: Negative.    HENT: Negative.     Eyes: Negative.    Respiratory: Negative.     Cardiovascular: Negative.    Gastrointestinal: Negative.    Genitourinary:  Negative for dysuria, flank pain, frequency and urgency.        +ED--having to take 5 pills prior to intercourse   Musculoskeletal:  Positive for arthralgias (chronic) and back pain (chronic). Myalgias: RLS.  Skin: Negative.    Neurological:  Negative for dizziness, speech difficulty, weakness, light-headedness and headaches.   Psychiatric/Behavioral:  Negative for hallucinations, self-injury, sleep disturbance (as long as taking RLS meds) and " "suicidal ideas. The patient is not nervous/anxious.       Objective   Vital Signs:   /90 (BP Location: Right arm, Patient Position: Sitting, Cuff Size: Adult)   Pulse 80   Temp 97.7 °F (36.5 °C) (Oral)   Ht 188 cm (74\")   Wt 117 kg (258 lb)   SpO2 97%   BMI 33.13 kg/m²       Physical Exam  Vitals and nursing note reviewed.   Constitutional:       General: He is not in acute distress.     Appearance: He is not ill-appearing.   HENT:      Head: Normocephalic and atraumatic.   Neck:      Thyroid: No thyromegaly.   Cardiovascular:      Rate and Rhythm: Normal rate and regular rhythm.   Pulmonary:      Effort: Pulmonary effort is normal. No respiratory distress.      Breath sounds: Normal breath sounds. No wheezing, rhonchi or rales.   Abdominal:      General: Bowel sounds are normal.      Palpations: Abdomen is soft.      Tenderness: There is no abdominal tenderness.   Musculoskeletal:      Cervical back: Neck supple.      Right lower leg: No edema.      Left lower leg: No edema.   Skin:     General: Skin is warm and dry.   Neurological:      General: No focal deficit present.      Mental Status: He is alert and oriented to person, place, and time.   Psychiatric:         Mood and Affect: Mood normal.         Thought Content: Thought content normal.        Result Review :     Common labs          1/3/2023    12:08 1/29/2023    18:49 8/3/2023    09:47 8/3/2023    10:19   Common Labs   Glucose 297  179         BUN 17       Creatinine 1.17       Sodium 144       Potassium 5.5  3.4         Chloride 103  105         Calcium 10.3       Total Protein 7.7       Albumin 5.1       Total Bilirubin <0.2       Alkaline Phosphatase 100       AST (SGOT) 22       ALT (SGPT) 24       WBC  11.88         Hemoglobin  14.5         Hematocrit  43.0         Platelets  260         Total Cholesterol    189    Triglycerides    303    HDL Cholesterol    40    LDL Cholesterol     98    Hemoglobin A1C   7.9     Microalbumin, Urine    " 10.8       Details          This result is from an external source.             TSH          12/16/2022    09:05   TSH   TSH 1.150                Assessment and Plan    Diagnoses and all orders for this visit:    1. Hypertension, essential (Primary)  -     NIFEdipine CC (ADALAT CC) 30 MG 24 hr tablet; Take 1 tablet by mouth Daily.  Dispense: 90 tablet; Refill: 1  -     metoprolol succinate XL (TOPROL-XL) 200 MG 24 hr tablet; Take 1 tablet by mouth Daily.  Dispense: 90 tablet; Refill: 1  -     lisinopril (PRINIVIL,ZESTRIL) 40 MG tablet; Take 1 tablet by mouth Daily.  Dispense: 90 tablet; Refill: 1    2. Hyperlipidemia, mixed    3. Restless leg syndrome  -     rOPINIRole (REQUIP) 1 MG tablet; Take 1 tablet by mouth Every Night.  Dispense: 90 tablet; Refill: 1    4. Anxiety  -     busPIRone (BUSPAR) 10 MG tablet; Take 1 tablet by mouth 2 (Two) Times a Day.  Dispense: 180 tablet; Refill: 1    5. Major depressive disorder, recurrent, moderate  -     DULoxetine (CYMBALTA) 60 MG capsule; Take 1 capsule by mouth Daily.  Dispense: 90 capsule; Refill: 1    6. Chronic bilateral low back pain, unspecified whether sciatica present  -     baclofen (LIORESAL) 20 MG tablet; Take 1 tablet by mouth 3 (Three) Times a Day. As needed  Dispense: 270 tablet; Refill: 1    7. Erectile dysfunction, unspecified erectile dysfunction type  -     sildenafil (REVATIO) 20 MG tablet; TAKE TWO TO FIVE TABLETS BY MOUTH 30 MINUTES PRIOR TO INTERCOURSE AS NEEDED  Dispense: 30 tablet; Refill: 1    8. Allergic rhinitis, unspecified seasonality, unspecified trigger  -     fluticasone (Flonase) 50 MCG/ACT nasal spray; 2 sprays each nostril once a day  Dispense: 48 g; Refill: 1    9. Gastroesophageal reflux disease, unspecified whether esophagitis present  -     esomeprazole (nexIUM) 40 MG capsule; Take 1 capsule by mouth Daily.  Dispense: 90 capsule; Refill: 1    10. Type 2 diabetes mellitus with hyperglycemia, without long-term current use of  insulin      Refills of above medications until he can get back in with PCP after the new year.   Encouraged to get fasting labs completed as ordered by PCP.    Continue with scheduled follow up appointments with endocrinology, pain management for additional refills on DM/HLD/Pain medications    Return to Same Day Clinic PRN acute problems/concerns    See PCP after first of year when back home for ongoing chronic disease management/medication refills      This document has been electronically signed by MELINA Pfeiffer on September 20, 2023 09:23 CDT,.    I spent 30 minutes caring for Zechariah on this date of service. This time includes time spent by me in the following activities:preparing for the visit, reviewing tests, obtaining and/or reviewing a separately obtained history, performing a medically appropriate examination and/or evaluation , counseling and educating the patient/family/caregiver, ordering medications, tests, or procedures, and documenting information in the medical record

## 2023-09-20 NOTE — OUTREACH NOTE
AMBULATORY CASE MANAGEMENT NOTE    Name and Relationship of Patient/Support Person: ESTEPHANIA PETERSEN - Emergency Contact    CCM Interim Update    Spoke to patient's wife about care. She states they relocated in [place] to help take care of parent with dementia. She states patient is doing well; he saw [facility] provider today in [place]. His blood sugars are below 200 and he continues to take Jardiance and Ozempic. Explained to spouse that program will be closed due to relocation. Advised patient to reach out if they need help in future once returning to [place]. No further needs.         Education Documentation  No documentation found.        Tisha FLORES  Ambulatory Case Management    9/20/2023, 15:53 EDT

## 2023-10-09 ENCOUNTER — TELEPHONE (OUTPATIENT)
Dept: ENDOCRINOLOGY | Facility: CLINIC | Age: 63
End: 2023-10-09
Payer: MEDICAID

## 2023-10-09 NOTE — TELEPHONE ENCOUNTER
Mrs Contreras states the pt has nausea for 2-3 days after taking ozempic and diarrhea for 24 hours.  He wants to continue taking it but is asking if he can have phenergan for the nausea.  He thinks if he can at least take the phenergan at night he can get through the day working better due to the nausea.  She states he has taken zofran in the past and was ineffective.  She thinks the symptoms are getting a little better since starting it 5 weeks ago    Please advise

## 2023-10-09 NOTE — TELEPHONE ENCOUNTER
PT'S WIFE CALLED STATING PT IS EXPERIENCING NAUSEA AND DIARRHEA FOR A FEW DAYS AFTER TAKING OZEMPIC. SHE REQUESTED A CALL BACK TO CONSULT.

## 2023-10-10 DIAGNOSIS — R11.0 NAUSEA: Primary | ICD-10-CM

## 2023-10-10 RX ORDER — PEN NEEDLE, DIABETIC 32 GX 1/6"
NEEDLE, DISPOSABLE MISCELLANEOUS
Qty: 30 EACH | Refills: 3 | Status: SHIPPED | OUTPATIENT
Start: 2023-10-10

## 2023-10-10 RX ORDER — PROMETHAZINE HYDROCHLORIDE 12.5 MG/1
12.5 TABLET ORAL EVERY 6 HOURS PRN
Qty: 12 TABLET | Refills: 0 | Status: SHIPPED | OUTPATIENT
Start: 2023-10-10

## 2023-10-10 NOTE — TELEPHONE ENCOUNTER
MARY pt's wife and advised. She will let us know if there are still issues with the Ozempic. IVANA

## 2023-11-08 NOTE — PROGRESS NOTES
Chief complaint/Reason for consult: T2DM    HPI: Mr. Contreras is a 63-year-old man with T2DM, hypertension and hyperlipidemia who comes for follow-up of diabetes.  He was last seen 8/3/2023 and reports since that time doing well without changes.       # Gyax1NG, controlled with complications  # Elevated urine microalbumin  - Diagnosed: ~2020 based on routine labs   - No strong family history of diabetes   - Current regimen includes: Jardiance 25 mg daily and Ozempic 0.25 mg weekly  - Did not tolerate Metformin due to diarrhea   - Initially had nausea with Ozempic and reports that has resolved   - Compliance with medications is good  - No  issues from Jardiance at this time  - HbA1c: 6.3% today   - Checks FSBG rarely, usually low to mid 100s   - BG review unavailable   - Hypoglycemia does not occur  - Patient denies neuropathy   - Patient denies gastroparesis   - Patient without known ASCVD   - Patient does have chronic constipation likely opioid induced and reports this is stable and not exacerbated by Ozempic  - taking ACEi/ARB; blood pressure today 136/84     DM Health Maintenance:  Ophtho: due, he will schedule this   Monofilament / Foot exam: Completed 8/3/2023  Lipids/Statin: taking a statin with last FLP showing LDL 98 done 8/3/2023  YASMANI:Cr 191.2 done 8/3/2023  TSH: 1.150 done 12/16/2022  Aspirin: not taking      # Mixed hyperlipidemia   - Fasting lipid panel done 8/3/2023 showed total cholesterol 189, triglycerides 303, HDL 40 and LDL 98  - Patient reports good compliance with atorvastatin 40 mg daily and Tricor 145 mg daily  - Ate just prior to coming in (biscuits and jones)      Past medical history, past surgical history, family history and social history reviewed within this encounter.     Review of Systems   Constitutional:  Negative for activity change and unexpected weight change.   HENT:  Negative for trouble swallowing and voice change.    Eyes:  Negative for visual disturbance.   Respiratory:   "Negative for shortness of breath.    Cardiovascular:  Negative for chest pain.   Gastrointestinal:  Positive for constipation. Negative for abdominal pain.   Endocrine: Negative for cold intolerance.   Genitourinary:  Negative for dysuria.   Musculoskeletal:  Negative for gait problem.   Skin:  Negative for rash.   Neurological:  Negative for numbness.   Psychiatric/Behavioral:  Negative for agitation and confusion.         /84   Pulse 91   Ht 188 cm (74\")   Wt 118 kg (260 lb)   SpO2 98%   BMI 33.38 kg/m²      Physical Exam  Vitals reviewed.   Constitutional:       General: He is not in acute distress.     Appearance: He is obese.   HENT:      Head: Normocephalic.      Nose: Nose normal.   Eyes:      Conjunctiva/sclera: Conjunctivae normal.   Cardiovascular:      Rate and Rhythm: Normal rate.   Pulmonary:      Effort: Pulmonary effort is normal.   Abdominal:      Tenderness: There is no guarding.   Musculoskeletal:         General: Normal range of motion.   Skin:     General: Skin is warm and dry.   Neurological:      Mental Status: He is alert and oriented to person, place, and time.   Psychiatric:         Mood and Affect: Mood normal.         Behavior: Behavior normal.         Thought Content: Thought content normal.          Labs and images reviewed as noted in the HPI    Assessment and plan:    Diagnoses and all orders for this visit:    1. Type 2 diabetes mellitus with hyperglycemia, without long-term current use of insulin (Primary)  Assessment & Plan:  -Patient now with good glycemic control  -Complications include known elevated urine microalbumin  -Continue Jardiance 25 mg daily  -Recommend increasing Ozempic to 0.5 mg weekly if tolerated to assist with continued weight loss  -Check fingerstick blood glucose daily  -Continue lisinopril; blood pressure today 136/84     DM Health Maintenance:  Ophtho: due, he will schedule this   Monofilament / Foot exam: Completed 8/3/2023  Lipids/Statin: taking a " statin with last FLP showing LDL 98 done 8/3/2023  YASMANI:Cr 191.2 done 8/3/2023  TSH: 1.150 done 12/16/2022  Aspirin: not taking     Orders:  -     POC Glucose, Blood  -     POC Glycosylated Hemoglobin (Hb A1C)  -     TSH; Future  -     Comprehensive Metabolic Panel; Future  -     Hemoglobin A1c; Future  -     empagliflozin (Jardiance) 25 MG tablet tablet; Take 1 tablet by mouth Daily.  Dispense: 90 tablet; Refill: 3  -     Semaglutide,0.25 or 0.5MG/DOS, (Ozempic, 0.25 or 0.5 MG/DOSE,) 2 MG/3ML solution pen-injector; Inject 0.5 mg under the skin into the appropriate area as directed 1 (One) Time Per Week.  Dispense: 3 mL; Refill: 5    2. Hyperlipidemia, mixed  Assessment & Plan:  -Uncontrolled  -Check fasting lipid panel prior to next visit  -Continue atorvastatin 40 mg daily and Tricor 145 mg daily  -Counseled on the importance of reduction of saturated fat in diet  -Continue good glycemic control  -If LDL remains elevated will add ezetimibe  -If triglycerides remain elevated will add Vascepa or Lovaza at next visit    Orders:  -     Lipid Panel; Future  -     atorvastatin (LIPITOR) 40 MG tablet; Take one pill by oral route once a day.  Dispense: 90 tablet; Refill: 1  -     fenofibrate (Tricor) 145 MG tablet; Take one tablet daily by mouth  Dispense: 30 tablet; Refill: 5         Return in about 3 months (around 2/9/2024) for T2DM TH okay .     Electronically signed by: Kyle S Rosenstein, MD

## 2023-11-09 ENCOUNTER — OFFICE VISIT (OUTPATIENT)
Dept: ENDOCRINOLOGY | Facility: CLINIC | Age: 63
End: 2023-11-09
Payer: MEDICAID

## 2023-11-09 VITALS
HEIGHT: 74 IN | OXYGEN SATURATION: 98 % | WEIGHT: 260 LBS | HEART RATE: 91 BPM | SYSTOLIC BLOOD PRESSURE: 136 MMHG | DIASTOLIC BLOOD PRESSURE: 84 MMHG | BODY MASS INDEX: 33.37 KG/M2

## 2023-11-09 DIAGNOSIS — E78.2 HYPERLIPIDEMIA, MIXED: ICD-10-CM

## 2023-11-09 DIAGNOSIS — E11.65 TYPE 2 DIABETES MELLITUS WITH HYPERGLYCEMIA, WITHOUT LONG-TERM CURRENT USE OF INSULIN: Primary | ICD-10-CM

## 2023-11-09 LAB
EXPIRATION DATE: ABNORMAL
EXPIRATION DATE: ABNORMAL
GLUCOSE BLDC GLUCOMTR-MCNC: 155 MG/DL (ref 70–130)
HBA1C MFR BLD: 6.3 % (ref 4.5–5.7)
Lab: ABNORMAL
Lab: ABNORMAL

## 2023-11-09 PROCEDURE — 1160F RVW MEDS BY RX/DR IN RCRD: CPT | Performed by: HOSPITALIST

## 2023-11-09 PROCEDURE — 3079F DIAST BP 80-89 MM HG: CPT | Performed by: HOSPITALIST

## 2023-11-09 PROCEDURE — 3044F HG A1C LEVEL LT 7.0%: CPT | Performed by: HOSPITALIST

## 2023-11-09 PROCEDURE — 3075F SYST BP GE 130 - 139MM HG: CPT | Performed by: HOSPITALIST

## 2023-11-09 PROCEDURE — 1159F MED LIST DOCD IN RCRD: CPT | Performed by: HOSPITALIST

## 2023-11-09 RX ORDER — ATORVASTATIN CALCIUM 40 MG/1
TABLET, FILM COATED ORAL
Qty: 90 TABLET | Refills: 1 | Status: SHIPPED | OUTPATIENT
Start: 2023-11-09

## 2023-11-09 RX ORDER — SEMAGLUTIDE 0.68 MG/ML
0.5 INJECTION, SOLUTION SUBCUTANEOUS WEEKLY
Qty: 3 ML | Refills: 5 | Status: SHIPPED | OUTPATIENT
Start: 2023-11-09

## 2023-11-09 RX ORDER — FENOFIBRATE 145 MG/1
TABLET, COATED ORAL
Qty: 30 TABLET | Refills: 5 | Status: SHIPPED | OUTPATIENT
Start: 2023-11-09

## 2023-11-09 NOTE — ASSESSMENT & PLAN NOTE
-Patient now with good glycemic control  -Complications include known elevated urine microalbumin  -Continue Jardiance 25 mg daily  -Recommend increasing Ozempic to 0.5 mg weekly if tolerated to assist with continued weight loss  -Check fingerstick blood glucose daily  -Continue lisinopril; blood pressure today 136/84     DM Health Maintenance:  Ophtho: due, he will schedule this   Monofilament / Foot exam: Completed 8/3/2023  Lipids/Statin: taking a statin with last FLP showing LDL 98 done 8/3/2023  YASMANI:Cr 191.2 done 8/3/2023  TSH: 1.150 done 12/16/2022  Aspirin: not taking

## 2023-11-09 NOTE — ASSESSMENT & PLAN NOTE
-Uncontrolled  -Check fasting lipid panel prior to next visit  -Continue atorvastatin 40 mg daily and Tricor 145 mg daily  -Counseled on the importance of reduction of saturated fat in diet  -Continue good glycemic control  -If LDL remains elevated will add ezetimibe  -If triglycerides remain elevated will add Vascepa or Lovaza at next visit

## 2023-11-20 ENCOUNTER — TELEPHONE (OUTPATIENT)
Dept: FAMILY MEDICINE CLINIC | Facility: CLINIC | Age: 63
End: 2023-11-20
Payer: MEDICAID

## 2023-11-20 NOTE — TELEPHONE ENCOUNTER
Caller: ESTEPHANIA PETERSEN    Relationship: Emergency Contact    Best call back number: [phone]    What form or medical record are you requesting: CHART NOTES; SLEEP STUDY AND CPAP ORDER    Who is requesting this form or medical record from you: DANIEL KILLIAN    How would you like to receive the form or medical records (pick-up, mail, fax): FAX:  [phone]      Timeframe paperwork needed: N/A    Additional notes:  ESTEPHANIA STATED THAT PATIENT WOULD NEED ABOVE SENT TO [facility] FOR HIS CPAP MACHINE

## 2023-11-21 NOTE — TELEPHONE ENCOUNTER
Called spoke with pt wife she said that he seen Dr. Figueroa told her she would have to contact there office to get there chart notes and his last sleep study for them to fax those. Pt wife verbalized understanding.

## 2023-12-29 ENCOUNTER — TELEPHONE (OUTPATIENT)
Dept: ENDOCRINOLOGY | Facility: CLINIC | Age: 63
End: 2023-12-29
Payer: MEDICAID

## 2023-12-29 NOTE — TELEPHONE ENCOUNTER
Caller: ESTEPHANIA PETERSEN    Relationship to patient: Emergency Contact    Best call back number:   [phone]        Patient is needing: PT SPOUSE CALLED IN TO NOTIFY DR. VERDUGO THAT HER  HAD COLLAPSED AT THE STORE AND WAS HOSPITALIZED . HE WAS IN THE HOSPITAL 12/22/23-12/25/23. PT SPOUSE IS WANTING TO KNOW IF THEY CAN SEE  DR. VERDUGO SOONER . PT HAD A INTESTINAL INFECTION. HIS LARGE INTESTINES WAS SWOLLEN SHUT. PLEASE ADVISE AND CALL BACK .

## 2024-02-13 ENCOUNTER — PRIOR AUTHORIZATION (OUTPATIENT)
Dept: ENDOCRINOLOGY | Facility: CLINIC | Age: 64
End: 2024-02-13
Payer: MEDICAID

## 2024-02-13 ENCOUNTER — TELEPHONE (OUTPATIENT)
Dept: ENDOCRINOLOGY | Facility: CLINIC | Age: 64
End: 2024-02-13
Payer: MEDICAID

## 2024-03-01 ENCOUNTER — TELEMEDICINE (OUTPATIENT)
Dept: ENDOCRINOLOGY | Facility: CLINIC | Age: 64
End: 2024-03-01
Payer: MEDICAID

## 2024-03-01 DIAGNOSIS — E11.65 TYPE 2 DIABETES MELLITUS WITH HYPERGLYCEMIA, WITHOUT LONG-TERM CURRENT USE OF INSULIN: Primary | ICD-10-CM

## 2024-03-01 DIAGNOSIS — E78.2 HYPERLIPIDEMIA, MIXED: ICD-10-CM

## 2024-03-01 RX ORDER — FENOFIBRATE 145 MG/1
TABLET, COATED ORAL
Qty: 30 TABLET | Refills: 5 | Status: SHIPPED | OUTPATIENT
Start: 2024-03-01

## 2024-03-01 RX ORDER — OLMESARTAN MEDOXOMIL 20 MG/1
20 TABLET ORAL DAILY
Start: 2024-03-01

## 2024-03-01 RX ORDER — SEMAGLUTIDE 1.34 MG/ML
1 INJECTION, SOLUTION SUBCUTANEOUS WEEKLY
Qty: 3 ML | Refills: 2 | Status: SHIPPED | OUTPATIENT
Start: 2024-03-01

## 2024-03-01 RX ORDER — ATORVASTATIN CALCIUM 40 MG/1
TABLET, FILM COATED ORAL
Qty: 90 TABLET | Refills: 1 | Status: SHIPPED | OUTPATIENT
Start: 2024-03-01

## 2024-03-01 NOTE — PROGRESS NOTES
Chief complaint/Reason for consult: T2DM    Visit done via audio/visual telehealth.  Patient confirms name, date of birth and is physically located in [place].  Patient reports being okay with doing the visit via telehealth.    HPI: Mr. Contreras is a 63-year-old man with T2DM, hypertension and hyperlipidemia who comes for follow-up of diabetes.  He was last seen 8/3/2023 and reports since that being hospitalized with abdominal issue that he thinks was due to lisinopril causing possible angioedema.  He has since stopped lisinopril and is on olmesartan and doing well without recurrent symptoms.     # [id number], controlled with complications  # Elevated urine microalbumin  - Diagnosed: ~2020 based on routine labs   - Current regimen includes: Jardiance 25 mg daily and Ozempic 0.5 mg weekly  - Did not tolerate Metformin due to diarrhea   - Initially had nausea with Ozempic and reports that has resolved since started bowel regimen with miralax for chronic constipation from opioids  - Reports having increased hunger after taking several doses of Ozempic 0.5 mg weekly and would like to increase the dose  - Compliance with medications is good  - No  issues from Jardiance at this time  - HbA1c: 6.3% 11/9/2023  - Checks FSBG rarely, usually low to mid 100s   - BG review unavailable   - Hypoglycemia does not occur  - Patient denies neuropathy   - Patient denies gastroparesis   - Patient without known ASCVD   - Patient does have chronic constipation likely opioid induced and reports this is stable   - taking Olmesartan now, had reaction to lisinopril (possible angioedema); rarely checks home BP, reports typically 110-120s.60-70s when he does check it     DM Health Maintenance:  Ophtho: due, he will schedule this   Monofilament / Foot exam: Completed 8/3/2023, no reported new foot sores  Lipids/Statin: taking a statin with last FLP showing LDL 98 done 8/3/2023  YASMANI:Cr 191.2 done 8/3/2023  TSH: 1.150 done 12/16/2022  Aspirin: not  taking      # Mixed hyperlipidemia   - Fasting lipid panel done 8/3/2023 showed total cholesterol 189, triglycerides 303, HDL 40 and LDL 98  - Patient reports good compliance with atorvastatin 40 mg daily and Tricor 145 mg daily    Past medical history, past surgical history, family history and social history reviewed within this encounter.     Review of Systems   Constitutional:  Negative for activity change and unexpected weight change.   HENT:  Negative for trouble swallowing and voice change.    Eyes:  Negative for visual disturbance.   Respiratory:  Negative for shortness of breath.    Cardiovascular:  Negative for chest pain.   Gastrointestinal:  Positive for constipation.   Endocrine: Negative for cold intolerance and heat intolerance.   Genitourinary:  Negative for dysuria.   Musculoskeletal:  Negative for gait problem.   Skin:  Negative for rash.   Neurological:  Negative for numbness.   Psychiatric/Behavioral:  Negative for agitation and confusion.         There were no vitals taken for this visit.     Physical Exam  Vitals reviewed.   Constitutional:       General: He is not in acute distress.     Appearance: He is obese.   HENT:      Head: Normocephalic.   Pulmonary:      Effort: Pulmonary effort is normal.   Neurological:      Mental Status: He is alert and oriented to person, place, and time.   Psychiatric:         Mood and Affect: Mood normal.         Behavior: Behavior normal.        Labs and images reviewed as noted in the HPI    Assessment and plan:    Diagnoses and all orders for this visit:    1. Type 2 diabetes mellitus with hyperglycemia, without long-term current use of insulin (Primary)  Assessment & Plan:  -Diabetes is reasonably well-controlled  -Complications include known elevated urine microalbumin  -Recommend continue good glycemic control to prevent progression of known complications and new complications arising  -Increase Ozempic to 1.0 mg weekly and continue Jardiance 25 mg  daily  -Recommend checking FSBG at least once daily  -On ARB, had possible angioedema with ACE inhibitor     DM Health Maintenance:  Ophtho: due, he will schedule this   Monofilament / Foot exam: Completed 8/3/2023, no reported new foot sores  Lipids/Statin: taking a statin with last FLP showing LDL 98 done 8/3/2023  YASMANI:Cr 191.2 done 8/3/2023  TSH: 1.150 done 12/16/2022  Aspirin: not taking     Orders:  -     Hemoglobin A1c; Future  -     Comprehensive Metabolic Panel; Future  -     TSH; Future  -     empagliflozin (Jardiance) 25 MG tablet tablet; Take 1 tablet by mouth Daily.  Dispense: 90 tablet; Refill: 3  -     Semaglutide, 1 MG/DOSE, (Ozempic, 1 MG/DOSE,) 4 MG/3ML solution pen-injector; Inject 1 mg under the skin into the appropriate area as directed 1 (One) Time Per Week.  Dispense: 3 mL; Refill: 2    2. Hyperlipidemia, mixed  Assessment & Plan:  -Check fasting lipid panel in the coming weeks  -For now continue atorvastatin 40 mg daily and Tricor 145 mg daily  -Will adjust medications if needed based on upcoming labs    Orders:  -     Lipid Panel; Future  -     atorvastatin (LIPITOR) 40 MG tablet; Take one pill by oral route once a day.  Dispense: 90 tablet; Refill: 1  -     fenofibrate (Tricor) 145 MG tablet; Take one tablet daily by mouth  Dispense: 30 tablet; Refill: 5    Other orders  -     olmesartan (BENICAR) 20 MG tablet; Take 1 tablet by mouth Daily.         Follow-up in 3 months    Electronically signed by: Kyle S Rosenstein, MD

## 2024-03-01 NOTE — ASSESSMENT & PLAN NOTE
-Check fasting lipid panel in the coming weeks  -For now continue atorvastatin 40 mg daily and Tricor 145 mg daily  -Will adjust medications if needed based on upcoming labs

## 2024-03-01 NOTE — ASSESSMENT & PLAN NOTE
-Diabetes is reasonably well-controlled  -Complications include known elevated urine microalbumin  -Recommend continue good glycemic control to prevent progression of known complications and new complications arising  -Increase Ozempic to 1.0 mg weekly and continue Jardiance 25 mg daily  -Recommend checking FSBG at least once daily  -On ARB, had possible angioedema with ACE inhibitor     DM Health Maintenance:  Ophtho: due, he will schedule this   Monofilament / Foot exam: Completed 8/3/2023, no reported new foot sores  Lipids/Statin: taking a statin with last FLP showing LDL 98 done 8/3/2023  YASMANI:Cr 191.2 done 8/3/2023  TSH: 1.150 done 12/16/2022  Aspirin: not taking

## 2024-04-24 DIAGNOSIS — E78.2 HYPERLIPIDEMIA, MIXED: ICD-10-CM

## 2024-04-24 RX ORDER — FENOFIBRATE 145 MG/1
TABLET, COATED ORAL
Qty: 90 TABLET | Refills: 0 | Status: SHIPPED | OUTPATIENT
Start: 2024-04-24

## 2024-04-24 NOTE — TELEPHONE ENCOUNTER
Last office visit with prescribing clinician: 3-1-24    Next office visit with prescribing clinician: Visit date not found     {

## 2024-05-02 RX ORDER — GEMFIBROZIL 600 MG/1
TABLET, FILM COATED ORAL
Qty: 180 TABLET | Refills: 0 | OUTPATIENT
Start: 2024-05-02

## 2024-05-07 RX ORDER — GEMFIBROZIL 600 MG/1
TABLET, FILM COATED ORAL
Qty: 180 TABLET | Refills: 0 | OUTPATIENT
Start: 2024-05-07

## 2024-06-28 ENCOUNTER — TELEPHONE (OUTPATIENT)
Dept: ENDOCRINOLOGY | Facility: CLINIC | Age: 64
End: 2024-06-28
Payer: MEDICAID

## 2024-06-28 DIAGNOSIS — E11.65 TYPE 2 DIABETES MELLITUS WITH HYPERGLYCEMIA, WITHOUT LONG-TERM CURRENT USE OF INSULIN: ICD-10-CM

## 2024-06-28 RX ORDER — SEMAGLUTIDE 1.34 MG/ML
1 INJECTION, SOLUTION SUBCUTANEOUS WEEKLY
Qty: 3 ML | Refills: 0 | Status: SHIPPED | OUTPATIENT
Start: 2024-06-28

## 2024-06-28 NOTE — TELEPHONE ENCOUNTER
NON-CONTROLLED RX REFILL AUTHORIZATION REQUEST  Drug: OZEMPIC 1 MG/DOSE (4MG/3 ML)  Qty: 3  Directions: Dial and inject under the skin 1 MG weekly   Last Fill Date: 05/23/24    [facility] [id number] AT [facility] [address] - [phone] [facility] [phone] FX

## 2024-06-28 NOTE — TELEPHONE ENCOUNTER
Patients spouse is calling on his behalf she said he is out of his medication and is needing the Ozempic Refill today.

## 2024-07-02 ENCOUNTER — TELEPHONE (OUTPATIENT)
Dept: ENDOCRINOLOGY | Facility: CLINIC | Age: 64
End: 2024-07-02

## 2024-07-02 NOTE — TELEPHONE ENCOUNTER
Caller: ESTEPHANIA PETERSEN    Relationship: Emergency Contact    Best call back number: [phone]    What orders are you requesting (i.e. lab or imaging): BLOOD WORK    In what timeframe would the patient need to come in: TODAY    Where will you receive your lab/imaging services: [facility] [phone]    Additional notes: [facility] [place] CANNOT ACCESS PT RECORDS ASKED IF WE FAX AN ORDER TODAY SO THAT PT CAN HAVE LABS DONE BEFORE HIS APPT ON 7/5. PLEASE CALL TO ADVISE ONCE ORDERS HAVE BEEN SENT

## 2024-07-05 ENCOUNTER — OFFICE VISIT (OUTPATIENT)
Dept: ENDOCRINOLOGY | Facility: CLINIC | Age: 64
End: 2024-07-05
Payer: MEDICAID

## 2024-07-05 VITALS
WEIGHT: 231 LBS | OXYGEN SATURATION: 98 % | SYSTOLIC BLOOD PRESSURE: 132 MMHG | DIASTOLIC BLOOD PRESSURE: 80 MMHG | HEART RATE: 86 BPM | BODY MASS INDEX: 29.65 KG/M2 | HEIGHT: 74 IN

## 2024-07-05 DIAGNOSIS — E11.65 TYPE 2 DIABETES MELLITUS WITH HYPERGLYCEMIA, WITHOUT LONG-TERM CURRENT USE OF INSULIN: Primary | ICD-10-CM

## 2024-07-05 DIAGNOSIS — E78.2 HYPERLIPIDEMIA, MIXED: ICD-10-CM

## 2024-07-05 DIAGNOSIS — I10 HYPERTENSION, ESSENTIAL: ICD-10-CM

## 2024-07-05 LAB
ALBUMIN SERPL-MCNC: 4.5 G/DL (ref 3.5–5.2)
ALBUMIN UR-MCNC: 1.4 MG/DL
ALBUMIN/GLOB SERPL: 1.7 G/DL
ALP SERPL-CCNC: 43 U/L (ref 39–117)
ALT SERPL W P-5'-P-CCNC: 20 U/L (ref 1–41)
ANION GAP SERPL CALCULATED.3IONS-SCNC: 11.8 MMOL/L (ref 5–15)
AST SERPL-CCNC: 17 U/L (ref 1–40)
BILIRUB SERPL-MCNC: 0.2 MG/DL (ref 0–1.2)
BUN SERPL-MCNC: 33 MG/DL (ref 8–23)
BUN/CREAT SERPL: 20.2 (ref 7–25)
CALCIUM SPEC-SCNC: 10.2 MG/DL (ref 8.6–10.5)
CHLORIDE SERPL-SCNC: 104 MMOL/L (ref 98–107)
CHOLEST SERPL-MCNC: 166 MG/DL (ref 0–200)
CO2 SERPL-SCNC: 22.2 MMOL/L (ref 22–29)
CREAT SERPL-MCNC: 1.63 MG/DL (ref 0.76–1.27)
CREAT UR-MCNC: 57.3 MG/DL
DEPRECATED RDW RBC AUTO: 40.9 FL (ref 37–54)
EGFRCR SERPLBLD CKD-EPI 2021: 46.8 ML/MIN/1.73
ERYTHROCYTE [DISTWIDTH] IN BLOOD BY AUTOMATED COUNT: 12.1 % (ref 12.3–15.4)
EXPIRATION DATE: NORMAL
EXPIRATION DATE: NORMAL
GLOBULIN UR ELPH-MCNC: 2.7 GM/DL
GLUCOSE BLDC GLUCOMTR-MCNC: 109 MG/DL (ref 70–130)
GLUCOSE SERPL-MCNC: 93 MG/DL (ref 65–99)
HBA1C MFR BLD: 5.5 % (ref 4.5–5.7)
HCT VFR BLD AUTO: 39.9 % (ref 37.5–51)
HDLC SERPL-MCNC: 33 MG/DL (ref 40–60)
HGB BLD-MCNC: 13.6 G/DL (ref 13–17.7)
LDLC SERPL CALC-MCNC: 94 MG/DL (ref 0–100)
LDLC/HDLC SERPL: 2.65 {RATIO}
Lab: NORMAL
Lab: NORMAL
MCH RBC QN AUTO: 31.6 PG (ref 26.6–33)
MCHC RBC AUTO-ENTMCNC: 34.1 G/DL (ref 31.5–35.7)
MCV RBC AUTO: 92.6 FL (ref 79–97)
MICROALBUMIN/CREAT UR: 24.4 MG/G (ref 0–29)
PLATELET # BLD AUTO: 321 10*3/MM3 (ref 140–450)
PMV BLD AUTO: 9.6 FL (ref 6–12)
POTASSIUM SERPL-SCNC: 5.1 MMOL/L (ref 3.5–5.2)
PROT SERPL-MCNC: 7.2 G/DL (ref 6–8.5)
RBC # BLD AUTO: 4.31 10*6/MM3 (ref 4.14–5.8)
SODIUM SERPL-SCNC: 138 MMOL/L (ref 136–145)
TRIGL SERPL-MCNC: 227 MG/DL (ref 0–150)
TSH SERPL DL<=0.05 MIU/L-ACNC: 1.16 UIU/ML (ref 0.27–4.2)
VLDLC SERPL-MCNC: 39 MG/DL (ref 5–40)
WBC NRBC COR # BLD AUTO: 6.87 10*3/MM3 (ref 3.4–10.8)

## 2024-07-05 PROCEDURE — 82043 UR ALBUMIN QUANTITATIVE: CPT | Performed by: HOSPITALIST

## 2024-07-05 PROCEDURE — 80061 LIPID PANEL: CPT | Performed by: HOSPITALIST

## 2024-07-05 PROCEDURE — 82570 ASSAY OF URINE CREATININE: CPT | Performed by: HOSPITALIST

## 2024-07-05 PROCEDURE — 80053 COMPREHEN METABOLIC PANEL: CPT | Performed by: HOSPITALIST

## 2024-07-05 PROCEDURE — 85027 COMPLETE CBC AUTOMATED: CPT | Performed by: HOSPITALIST

## 2024-07-05 PROCEDURE — 84443 ASSAY THYROID STIM HORMONE: CPT | Performed by: HOSPITALIST

## 2024-07-05 RX ORDER — PANTOPRAZOLE SODIUM 40 MG/1
40 TABLET, DELAYED RELEASE ORAL DAILY
COMMUNITY

## 2024-07-05 RX ORDER — FENOFIBRATE 145 MG/1
TABLET, COATED ORAL
Qty: 90 TABLET | Refills: 0 | Status: SHIPPED | OUTPATIENT
Start: 2024-07-05

## 2024-07-05 RX ORDER — SEMAGLUTIDE 2.68 MG/ML
2 INJECTION, SOLUTION SUBCUTANEOUS WEEKLY
Qty: 9 ML | Refills: 0 | Status: SHIPPED | OUTPATIENT
Start: 2024-07-05

## 2024-07-05 RX ORDER — ATORVASTATIN CALCIUM 40 MG/1
TABLET, FILM COATED ORAL
Qty: 90 TABLET | Refills: 1 | Status: SHIPPED | OUTPATIENT
Start: 2024-07-05

## 2024-07-05 NOTE — ASSESSMENT & PLAN NOTE
-Will check nonfasting lipid panel today  -Continue atorvastatin 40 mg daily and fenofibrate 145 mg daily  -Recommend low-fat diet, good glycemic control and minimizing alcohol intake

## 2024-07-05 NOTE — ASSESSMENT & PLAN NOTE
-Blood pressure today 132/80  -Continue metoprolol  mg daily, nifedipine 30 mg daily and olmesartan 20 mg daily per PCP  -Recommend increasing fluid intake to help with orthostatic hypotension, likely losing excess water from Jardiance and working outside in the heat

## 2024-07-05 NOTE — PROGRESS NOTES
Chief complaint/Reason for consult: T2DM    HPI: Mr. Contreras is a 64-year-old man with T2DM, hypertension and hyperlipidemia who comes for follow-up of diabetes.  He was last seen 3/1/2024 via telehealth and reports since that time no significant changes in health.      # [id number], controlled with complications  # Elevated urine microalbumin  - Diagnosed: ~2020 based on routine labs   - Current regimen includes: Jardiance 25 mg daily and Ozempic 1.0 mg weekly  - Did not tolerate Metformin due to diarrhea   - Initially had nausea with Ozempic and reports that has resolved since started bowel regimen with miralax for chronic constipation from opioids  - Compliance with medications is good  - No  issues from Jardiance at this time  - Has occasional orthostatic hypotension when standing  - HbA1c: 5.5% today  - In office blood glucose 109 mg/dL  - Checks FSBG rarely  - BG review unavailable  - Hypoglycemia does not occur that he is aware of  - Patient denies neuropathy   - Patient denies gastroparesis   - Patient without known ASCVD   - taking Olmesartan, had reaction to lisinopril (possible angioedema); BP today 132/80     DM Health Maintenance:  Ophtho: due, he will schedule this   Monofilament / Foot exam: Completed 8/3/2023, no reported new foot sores, declines exam today  Lipids/Statin: taking a statin with last FLP showing LDL 98 done 8/3/2023  YASMANI:Cr 191.2 done 8/3/2023  TSH: 1.150 done 12/16/2022  Aspirin: not taking      # Mixed hyperlipidemia   - Fasting lipid panel done 8/3/2023 showed total cholesterol 189, triglycerides 303, HDL 40 and LDL 98  - Patient reports good compliance with atorvastatin 40 mg daily and Tricor 145 mg daily    Past medical history, past surgical history, family history and social history reviewed within this encounter.     Review of Systems   Constitutional:  Negative for activity change and unexpected weight change.   HENT:  Negative for trouble swallowing and voice change.    Eyes:   "Negative for visual disturbance.   Respiratory:  Negative for shortness of breath.    Cardiovascular:  Negative for chest pain.   Gastrointestinal:  Positive for constipation.   Endocrine: Negative for cold intolerance and heat intolerance.   Genitourinary:  Negative for dysuria.   Musculoskeletal:  Positive for arthralgias.   Skin:  Negative for rash.   Neurological:  Positive for dizziness. Negative for numbness.   Psychiatric/Behavioral:  Negative for agitation and confusion.         /80 (BP Location: Left arm, Patient Position: Sitting, Cuff Size: Adult)   Pulse 86   Ht 188 cm (74\")   Wt 105 kg (231 lb)   SpO2 98%   BMI 29.66 kg/m²      Physical Exam  Vitals reviewed.   Constitutional:       General: He is not in acute distress.  HENT:      Head: Normocephalic.      Nose: Nose normal.   Eyes:      Conjunctiva/sclera: Conjunctivae normal.   Cardiovascular:      Rate and Rhythm: Normal rate.   Pulmonary:      Effort: Pulmonary effort is normal.   Abdominal:      Tenderness: There is no guarding.   Musculoskeletal:         General: No deformity.   Skin:     General: Skin is warm and dry.   Neurological:      Mental Status: He is alert and oriented to person, place, and time.   Psychiatric:         Mood and Affect: Mood normal.         Behavior: Behavior normal.          Labs and images reviewed as noted in the HPI    Assessment and plan:    Diagnoses and all orders for this visit:    1. Type 2 diabetes mellitus with hyperglycemia, without long-term current use of insulin (Primary)  Assessment & Plan:  -Diabetes is currently well-controlled  -Complications include known elevated urine microalbumin  -Recommend increasing physical activity and adding resistance training  -Recommend increasing lean protein in his diet and increasing water intake  -Continue Jardiance 25 mg daily  -Increase Ozempic to 2.0 mg weekly  -Continue bowel regiment for constipation, may need to increase frequency of MiraLAX and take " as needed senna  -Recommend checking FSBG a few times per week at home  -Continue olmesartan, had reaction to lisinopril (possible angioedema); BP today 132/80     DM Health Maintenance:  Ophtho: due, he will schedule this   Monofilament / Foot exam: Completed 8/3/2023, no reported new foot sores, declines exam today  Lipids/Statin: Continue statin with last FLP showing LDL 98 done 8/3/2023  YASMANI:Cr 191.2 done 8/3/2023  TSH: 1.150 done 12/16/2022  Aspirin: not taking     Orders:  -     POC Glycosylated Hemoglobin (Hb A1C)  -     POC Glucose, Blood  -     Semaglutide, 2 MG/DOSE, (Ozempic, 2 MG/DOSE,) 8 MG/3ML solution pen-injector; Inject 2 mg under the skin into the appropriate area as directed 1 (One) Time Per Week.  Dispense: 9 mL; Refill: 0  -     empagliflozin (Jardiance) 25 MG tablet tablet; Take 1 tablet by mouth Daily.  Dispense: 90 tablet; Refill: 3  -     Microalbumin / Creatinine Urine Ratio - Urine, Clean Catch; Future  -     CBC (No Diff); Future  -     TSH; Future  -     Microalbumin / Creatinine Urine Ratio - Urine, Clean Catch  -     CBC (No Diff)  -     TSH    2. Hyperlipidemia, mixed  Assessment & Plan:  -Will check nonfasting lipid panel today  -Continue atorvastatin 40 mg daily and fenofibrate 145 mg daily  -Recommend low-fat diet, good glycemic control and minimizing alcohol intake    Orders:  -     Lipid Panel; Future  -     Lipid Panel    3. Hypertension, essential  Assessment & Plan:  -Blood pressure today 132/80  -Continue metoprolol  mg daily, nifedipine 30 mg daily and olmesartan 20 mg daily per PCP  -Recommend increasing fluid intake to help with orthostatic hypotension, likely losing excess water from Jardiance and working outside in the heat    Orders:  -     Comprehensive Metabolic Panel; Future  -     Comprehensive Metabolic Panel       Return in about 3 months (around 10/5/2024) for T2DM.       Please note that portions of this note may have been completed with a voice  recognition program. Efforts were made to edit the dictations, but occasionally words are mistranscribed.     Electronically signed by: Kyle S Rosenstein, MD   Addendum  Labs 7/5/2024  Creatinine 1.63, eGFR 46.8  Recommend patient minimize NSAIDs, improve hydration and have PCP follow-up renal function in 2 months    TSH 1.160  Total cholesterol 166, HDL 33, LDL 94 and triglycerides 227, this was nonfasting, LDL still above goal recommend the addition of ezetimibe  CBC grossly unremarkable other than RDW that is slightly low but stable    YASMANI:Cr 24    Results sent to [facility]

## 2024-07-05 NOTE — ASSESSMENT & PLAN NOTE
-Diabetes is currently well-controlled  -Complications include known elevated urine microalbumin  -Recommend increasing physical activity and adding resistance training  -Recommend increasing lean protein in his diet and increasing water intake  -Continue Jardiance 25 mg daily  -Increase Ozempic to 2.0 mg weekly  -Continue bowel regiment for constipation, may need to increase frequency of MiraLAX and take as needed senna  -Recommend checking FSBG a few times per week at home  -Continue olmesartan, had reaction to lisinopril (possible angioedema); BP today 132/80     DM Health Maintenance:  Ophtho: due, he will schedule this   Monofilament / Foot exam: Completed 8/3/2023, no reported new foot sores, declines exam today  Lipids/Statin: Continue statin with last FLP showing LDL 98 done 8/3/2023  YASMANI:Cr 191.2 done 8/3/2023  TSH: 1.150 done 12/16/2022  Aspirin: not taking

## 2024-07-06 RX ORDER — EZETIMIBE 10 MG/1
10 TABLET ORAL DAILY
Qty: 90 TABLET | Refills: 3 | Status: SHIPPED | OUTPATIENT
Start: 2024-07-06 | End: 2025-07-06

## 2024-07-08 ENCOUNTER — PRIOR AUTHORIZATION (OUTPATIENT)
Dept: ENDOCRINOLOGY | Facility: CLINIC | Age: 64
End: 2024-07-08
Payer: MEDICAID

## 2024-07-08 NOTE — TELEPHONE ENCOUNTER
Zechariah Contreras (Ann: [id number])  Rx #: [id number]  Ozempic (2 MG/DOSE) 8MG/3ML pen-injectors     Form  [employer] [place] Medicaid ePA Form 2017 NCPDP  Created  3 days ago  Sent to Plan  3 hours ago  Plan Response  3 hours ago  Submit Clinical Questions  2 hours ago  Determination  Favorable  22 minutes ago  Message from Plan  The request has been approved. The authorization is effective from 07/29/2024 to 01/07/2025, as long as the member is enrolled in their current health plan.. Authorization Expiration Date: January 7, 2025.

## 2024-08-28 NOTE — OUTREACH NOTE
AMBULATORY CASE MANAGEMENT NOTE    Name and Relationship of Patient/Support Person: TRINITYESTEPHANIA - Emergency Contact     [facility] Interim Update    Spoke to patient's wife about diabetes management. She states he is checking blood sugar daily, but he has not recorded values. His blood sugar has ran between 141 to 186.  Wife states his blood sugar dropped to 41 once within the last few weeks. She believes it may have been a a faulty report. Advised patient to contact AC if levels drop below 100 or above 200. Educated family about signs/symptoms of hypoglycemia. Patient does not have glucagon injection at home. Sent order request to PCP.. Encouraged patient to begin recording values. Also provided number to endocrinology and advised patient to reschedule appointment.    Patient has had difficulty picking up Jardiance due to need for PA. Called [facility] pharmacy. Rep states PA was submitted and they plan to process medication in a few days.     Wife states patient checks blood pressure daily. Log is not accessible at this time. She states his pressure has remained within normal range. Advised patient to contact AC if systolic blood pressure is below 90 or above 160. Wife agreed.    Patient is still struggling with sleep. Wife states his poor sleep is mostly due to pain from shoulder and back. Patient is interested in referral to neurosurgery and orthopedic provider for further assessment. Sent request to PCP.         Adult Patient Profile  Questions/Answers    Flowsheet Row Most Recent Value   Symptoms/Conditions Managed at Home musculoskeletal   Musculoskeletal Symptoms/Conditions back pain, other (see comments)  [shoulder pain]   Musculoskeletal Management Strategies adequate rest, medication therapy          Education Documentation  No documentation found.        Tisha FLORES  Ambulatory Case Management    3/27/2023, 10:10 EDT  
274.9

## 2024-10-11 DIAGNOSIS — E11.65 TYPE 2 DIABETES MELLITUS WITH HYPERGLYCEMIA, WITHOUT LONG-TERM CURRENT USE OF INSULIN: ICD-10-CM

## 2024-10-11 RX ORDER — SEMAGLUTIDE 2.68 MG/ML
INJECTION, SOLUTION SUBCUTANEOUS
Qty: 3 ML | Refills: 2 | Status: SHIPPED | OUTPATIENT
Start: 2024-10-11

## 2024-10-11 NOTE — TELEPHONE ENCOUNTER
Rx Refill Note  Requested Prescriptions     Pending Prescriptions Disp Refills    Ozempic, 2 MG/DOSE, 8 MG/3ML solution pen-injector [Pharmacy Med Name: OZEMPIC 2 MG/DOSE (8 MG/3 ML)] 3 mL      Sig: DIAL AND INJECT UNDER THE SKIN 2 MG WEEKLY          Last office visit with prescribing clinician: 7/5/2024     Next office visit with prescribing clinician: 11/1/2024         Merle Musa MA  10/11/24, 15:16 EDT

## 2024-11-05 ENCOUNTER — TELEPHONE (OUTPATIENT)
Dept: ENDOCRINOLOGY | Facility: CLINIC | Age: 64
End: 2024-11-05

## 2024-11-05 NOTE — TELEPHONE ENCOUNTER
Provider: ROSENSTEIN     Caller: ESTEPHANIA PETERSEN    Relationship to Patient: SPOUSE     Reason for Call: PATIENT WOULD LIKE TO KNOW IF CAN HAVE A VIDEO VISIT FOR HIS APPT. PLEASE CALL

## 2024-11-06 NOTE — PROGRESS NOTES
Chief complaint/Reason for consult: T2DM    Mode of Visit: Video  Location of patient: -HOME-  Location of provider: [facility]  You have chosen to receive care through a telehealth visit.  The patient has signed the video visit consent form.  The visit included audio and video interaction. No technical issues occurred during this visit.    HPI: Mr. Contreras is a 64-year-old man with T2DM, hypertension and hyperlipidemia who comes for follow-up of diabetes.  He was last seen 7/5/2024 and reports since that time no significant changes in health.       # [id number], controlled without complications  - Diagnosed: ~2020 based on routine labs  - Current regimen includes: Jardiance 25 mg daily and Ozempic 2.0 mg weekly  - Did not tolerate Metformin due to diarrhea   - Reports Ozempic is tolerated without GI issues other than occasional constipation   - Compliance with medications is good  - No  issues from Jardiance at this time other than urinating more frequently   - Has occasional orthostatic hypotension when standing but it has improved   - HbA1c: due  - Checks FSBG rarely, when he does her reports typically it is mid 100s   - BG review unavailable  - Hypoglycemia does not occur that he is aware of  - Patient denies neuropathy  - Patient denies gastroparesis  - Patient without known ASCVD  - taking Olmesartan, had reaction to lisinopril (possible angioedema)     DM Health Maintenance:  Ophtho: due, he will schedule this  Monofilament / Foot exam: Completed 8/3/2023, no reported new foot sores  Lipids/Statin: taking a statin with last FLP showing LDL 94 done 7/5/2024  YASMANI:Cr 24 done 7/5/2024  TSH: 1.160 done 7/5/2024  Aspirin: not taking      # Mixed hyperlipidemia  - Fasting lipid panel done 7/5/2024 with total cholesterol 166, HDL 33, LDL 94 and triglycerides 227  - Patient reports good compliance with atorvastatin 40 mg daily and Tricor 145 mg daily    # MANSI  - Creatinine was elevated at last visit, he denies any  NSAID use  - Reports having borderline elevated creatinine in the past  - Reports good urine output    Past medical history, past surgical history, family history and social history reviewed within this encounter.     Review of Systems     There were no vitals taken for this visit.     Physical Exam     Labs and images reviewed as noted in the HPI    Assessment and plan:    Diagnoses and all orders for this visit:    1. Hypertension, essential (Primary)  Assessment & Plan:  -Patient reports good control of blood pressure at this time  -Continue metoprolol  mg daily, nifedipine 30 mg daily and olmesartan 20 mg daily per PCP       2. Hyperlipidemia, mixed  -     atorvastatin (LIPITOR) 40 MG tablet; Take one pill by oral route once a day.  Dispense: 90 tablet; Refill: 1  -     fenofibrate (TRICOR) 145 MG tablet; TAKE 1 TABLET BY MOUTH DAILY  Dispense: 90 tablet; Refill: 0  -     Lipid Panel; Future    3. Type 2 diabetes mellitus with hyperglycemia, without long-term current use of insulin  Assessment & Plan:  -Diabetes is currently well-controlled  -Complications include known elevated urine microalbumin  -Recommend increasing physical activity and adding resistance training  -Recommend increasing lean protein in his diet and increasing water intake  -Continue Jardiance 25 mg daily  -Continue Ozempic 2.0 mg weekly  -Continue bowel regiment for constipation  -Check A1c with upcoming labs   -Recommend checking FSBG a few times per week at home  -Continue olmesartan, had reaction to lisinopril (possible angioedema)    DM Health Maintenance:  Ophtho: due, he will schedule this  Monofilament / Foot exam: Completed 8/3/2023, no reported new foot sores  Lipids/Statin: taking a statin with last FLP showing LDL 94 done 7/5/2024  YASMANI:Cr 24 done 7/5/2024  TSH: 1.160 done 7/5/2024  Aspirin: not taking     Orders:  -     empagliflozin (Jardiance) 25 MG tablet tablet; Take 1 tablet by mouth Daily.  Dispense: 90 tablet; Refill:  3  -     Semaglutide, 2 MG/DOSE, (Ozempic, 2 MG/DOSE,) 8 MG/3ML solution pen-injector; Inject 2 mg under the skin into the appropriate area as directed 1 (One) Time Per Week.  Dispense: 9 mL; Refill: 1  -     CBC (No Diff); Future  -     Hemoglobin A1c; Future  -     TSH; Future  -     Microalbumin / Creatinine Urine Ratio - Urine, Clean Catch; Future    4. MANSI (acute kidney injury)  Assessment & Plan:  -Creatinine elevated at last visit, recommend repeating with upcoming labs  -Recommend avoiding nephrotoxins  -Currently taking Jardiance and ARB  -If it remains abnormal will discuss nephrology consult    Orders:  -     Comprehensive Metabolic Panel; Future         Return in about 3 months (around 2/7/2025) for T2DM.       Please note that portions of this note may have been completed with a voice recognition program. Efforts were made to edit the dictations, but occasionally words are mistranscribed.     Electronically signed by: Kyle S Rosenstein, MD

## 2024-11-07 ENCOUNTER — TELEMEDICINE (OUTPATIENT)
Dept: ENDOCRINOLOGY | Facility: CLINIC | Age: 64
End: 2024-11-07
Payer: MEDICAID

## 2024-11-07 DIAGNOSIS — I10 HYPERTENSION, ESSENTIAL: Primary | ICD-10-CM

## 2024-11-07 DIAGNOSIS — N17.9 AKI (ACUTE KIDNEY INJURY): ICD-10-CM

## 2024-11-07 DIAGNOSIS — E11.65 TYPE 2 DIABETES MELLITUS WITH HYPERGLYCEMIA, WITHOUT LONG-TERM CURRENT USE OF INSULIN: ICD-10-CM

## 2024-11-07 DIAGNOSIS — E78.2 HYPERLIPIDEMIA, MIXED: ICD-10-CM

## 2024-11-07 RX ORDER — SEMAGLUTIDE 2.68 MG/ML
2 INJECTION, SOLUTION SUBCUTANEOUS WEEKLY
Qty: 9 ML | Refills: 1 | Status: SHIPPED | OUTPATIENT
Start: 2024-11-07

## 2024-11-07 RX ORDER — FENOFIBRATE 145 MG/1
TABLET, COATED ORAL
Qty: 90 TABLET | Refills: 0 | Status: SHIPPED | OUTPATIENT
Start: 2024-11-07

## 2024-11-07 RX ORDER — ATORVASTATIN CALCIUM 40 MG/1
TABLET, FILM COATED ORAL
Qty: 90 TABLET | Refills: 1 | Status: SHIPPED | OUTPATIENT
Start: 2024-11-07

## 2024-11-07 NOTE — ASSESSMENT & PLAN NOTE
-Patient reports good control of blood pressure at this time  -Continue metoprolol  mg daily, nifedipine 30 mg daily and olmesartan 20 mg daily per PCP

## 2024-11-07 NOTE — ASSESSMENT & PLAN NOTE
-Creatinine elevated at last visit, recommend repeating with upcoming labs  -Recommend avoiding nephrotoxins  -Currently taking Jardiance and ARB  -If it remains abnormal will discuss nephrology consult

## 2024-11-07 NOTE — ASSESSMENT & PLAN NOTE
-Diabetes is currently well-controlled  -Complications include known elevated urine microalbumin  -Recommend increasing physical activity and adding resistance training  -Recommend increasing lean protein in his diet and increasing water intake  -Continue Jardiance 25 mg daily  -Continue Ozempic 2.0 mg weekly  -Continue bowel regiment for constipation  -Check A1c with upcoming labs   -Recommend checking FSBG a few times per week at home  -Continue olmesartan, had reaction to lisinopril (possible angioedema)    DM Health Maintenance:  Ophtho: due, he will schedule this  Monofilament / Foot exam: Completed 8/3/2023, no reported new foot sores  Lipids/Statin: taking a statin with last FLP showing LDL 94 done 7/5/2024  YASMANI:Cr 24 done 7/5/2024  TSH: 1.160 done 7/5/2024  Aspirin: not taking

## 2024-12-27 ENCOUNTER — PRIOR AUTHORIZATION (OUTPATIENT)
Dept: ENDOCRINOLOGY | Facility: CLINIC | Age: 64
End: 2024-12-27
Payer: MEDICAID

## 2025-04-26 DIAGNOSIS — E78.2 HYPERLIPIDEMIA, MIXED: ICD-10-CM

## 2025-04-28 ENCOUNTER — TELEPHONE (OUTPATIENT)
Dept: ENDOCRINOLOGY | Facility: CLINIC | Age: 65
End: 2025-04-28
Payer: MEDICAID

## 2025-04-28 DIAGNOSIS — E11.65 TYPE 2 DIABETES MELLITUS WITH HYPERGLYCEMIA, WITHOUT LONG-TERM CURRENT USE OF INSULIN: ICD-10-CM

## 2025-04-28 DIAGNOSIS — N17.9 AKI (ACUTE KIDNEY INJURY): ICD-10-CM

## 2025-04-28 DIAGNOSIS — E78.2 HYPERLIPIDEMIA, MIXED: Primary | ICD-10-CM

## 2025-04-28 RX ORDER — FENOFIBRATE 145 MG/1
145 TABLET, FILM COATED ORAL DAILY
Qty: 90 TABLET | Refills: 0 | Status: SHIPPED | OUTPATIENT
Start: 2025-04-28

## 2025-04-28 NOTE — TELEPHONE ENCOUNTER
----- Message from Kyle Rosenstein sent at 4/28/2025 10:13 AM EDT -----  Regarding: labs  Could you call patient and let him know he needs to have labs done in the next week or so, I placed an order for external labs just now, could you mail them to him? Labs need to be fasting but drink plenty of water prior.

## 2025-04-28 NOTE — TELEPHONE ENCOUNTER
Rx Refill Note  Requested Prescriptions     Pending Prescriptions Disp Refills    fenofibrate (TRICOR) 145 MG tablet [Pharmacy Med Name: FENOFIBRATE 145 MG TABLET] 90 tablet 0     Sig: TAKE 1 TABLET BY MOUTH DAILY      Last office visit with prescribing clinician: 7/5/2024     Next office visit with prescribing clinician: 9/5/2025     Nidia Khan MA  04/28/25, 10:07 EDT

## 2025-04-28 NOTE — TELEPHONE ENCOUNTER
SPOKE TO PTS WIFE-SHE STATES HE LABS A FEW WEEKS AGO (DONE 3/28/25).  CALLED PCP AND GOT THEM FAXED

## 2025-05-27 ENCOUNTER — TELEPHONE (OUTPATIENT)
Dept: ENDOCRINOLOGY | Facility: CLINIC | Age: 65
End: 2025-05-27

## 2025-05-27 NOTE — TELEPHONE ENCOUNTER
PT'S WIFE ESTEPHANIA CALLED REQUESTING A SAMPLE OF OZEMPIC AS PT'S INSURANCE IS NOT CURRENTLY ACTIVE.

## 2025-05-27 NOTE — TELEPHONE ENCOUNTER
Returned call, advised we don't have any samples at this time. Last dose was ~1 week ago, advised to monitor BG levels. If they start to see a significant rise in BG levels he may need to adjust or add a different medication. Encouraged to call with questions/concerns.

## 2025-06-09 ENCOUNTER — TELEPHONE (OUTPATIENT)
Dept: ENDOCRINOLOGY | Facility: CLINIC | Age: 65
End: 2025-06-09

## 2025-06-09 DIAGNOSIS — E11.65 TYPE 2 DIABETES MELLITUS WITH HYPERGLYCEMIA, WITHOUT LONG-TERM CURRENT USE OF INSULIN: Primary | ICD-10-CM

## 2025-06-09 RX ORDER — SEMAGLUTIDE 0.68 MG/ML
0.5 INJECTION, SOLUTION SUBCUTANEOUS WEEKLY
Qty: 3 ML | Refills: 1 | Status: SHIPPED | OUTPATIENT
Start: 2025-06-09

## 2025-06-09 NOTE — TELEPHONE ENCOUNTER
PT'S WIFE ESTEPHANIA CALLED STATING THIS PT WAS OUT OF OZEMPIC FOR A FEW WEEKS AND WANTED TO KNOW WHAT DOSE HE SHOULD START BACK ON.

## 2025-06-09 NOTE — TELEPHONE ENCOUNTER
I sent 0.5 mg weekly dose to el[facility]- would titrate every 4 weeks until he is back at 2 mg dose   Why did he stop it?  They will need to call for dose titration monthly  Thanks, shayla

## 2025-06-10 NOTE — TELEPHONE ENCOUNTER
Spoke with hank - he stopped it because he lost his insurance.  Will restart med and call monthly to increase as tolerated

## 2025-06-11 ENCOUNTER — PRIOR AUTHORIZATION (OUTPATIENT)
Dept: ENDOCRINOLOGY | Facility: CLINIC | Age: 65
End: 2025-06-11
Payer: MEDICAID

## 2025-06-11 NOTE — TELEPHONE ENCOUNTER
Zechariah Contreras (Ann: [id number])  Rx #: [id number]  Ozempic (0.25 or 0.5 MG/DOSE) 2MG/3ML pen-injectors  Form  [employer] Electronic PA Form  Created  15 hours ago  Sent to Plan  1 hour ago  Plan Response  1 hour ago  Submit Clinical Questions  1 hour ago  Determination  Favorable  1 hour ago  Message from Plan  PA Case: [id number], Status: Approved, Coverage Starts on: 1/1/2025 12:00:00 AM, Coverage Ends on: 12/31/2025 12:00:00 AM. Questions? Contact [phone].. Authorization Expiration Date: December 31, 2025.

## 2025-06-19 ENCOUNTER — OFFICE VISIT (OUTPATIENT)
Dept: FAMILY MEDICINE CLINIC | Facility: CLINIC | Age: 65
End: 2025-06-19
Payer: MEDICARE

## 2025-06-19 VITALS
HEIGHT: 74 IN | DIASTOLIC BLOOD PRESSURE: 80 MMHG | WEIGHT: 242 LBS | SYSTOLIC BLOOD PRESSURE: 138 MMHG | HEART RATE: 65 BPM | BODY MASS INDEX: 31.06 KG/M2 | OXYGEN SATURATION: 96 %

## 2025-06-19 DIAGNOSIS — M54.50 CHRONIC BILATERAL LOW BACK PAIN, UNSPECIFIED WHETHER SCIATICA PRESENT: ICD-10-CM

## 2025-06-19 DIAGNOSIS — E78.2 HYPERLIPIDEMIA, MIXED: ICD-10-CM

## 2025-06-19 DIAGNOSIS — E11.65 TYPE 2 DIABETES MELLITUS WITH HYPERGLYCEMIA, WITHOUT LONG-TERM CURRENT USE OF INSULIN: ICD-10-CM

## 2025-06-19 DIAGNOSIS — F33.0 MAJOR DEPRESSIVE DISORDER, RECURRENT, MILD: ICD-10-CM

## 2025-06-19 DIAGNOSIS — G47.00 INSOMNIA, UNSPECIFIED TYPE: ICD-10-CM

## 2025-06-19 DIAGNOSIS — G25.81 RESTLESS LEG SYNDROME: ICD-10-CM

## 2025-06-19 DIAGNOSIS — F41.1 GENERALIZED ANXIETY DISORDER: ICD-10-CM

## 2025-06-19 DIAGNOSIS — D64.9 ANEMIA, UNSPECIFIED TYPE: Primary | ICD-10-CM

## 2025-06-19 DIAGNOSIS — Z79.899 DRUG THERAPY: ICD-10-CM

## 2025-06-19 DIAGNOSIS — K21.9 GASTROESOPHAGEAL REFLUX DISEASE, UNSPECIFIED WHETHER ESOPHAGITIS PRESENT: ICD-10-CM

## 2025-06-19 DIAGNOSIS — I10 HYPERTENSION, ESSENTIAL: ICD-10-CM

## 2025-06-19 DIAGNOSIS — G89.29 CHRONIC BILATERAL LOW BACK PAIN, UNSPECIFIED WHETHER SCIATICA PRESENT: ICD-10-CM

## 2025-06-19 DIAGNOSIS — J30.9 ALLERGIC RHINITIS, UNSPECIFIED SEASONALITY, UNSPECIFIED TRIGGER: ICD-10-CM

## 2025-06-19 LAB
AMPHET+METHAMPHET UR QL: NEGATIVE
AMPHETAMINE INTERNAL CONTROL: ABNORMAL
AMPHETAMINES UR QL: NEGATIVE
BARBITURATE INTERNAL CONTROL: ABNORMAL
BARBITURATES UR QL SCN: NEGATIVE
BENZODIAZ UR QL SCN: NEGATIVE
BENZODIAZEPINE INTERNAL CONTROL: ABNORMAL
BUPRENORPHINE INTERNAL CONTROL: ABNORMAL
BUPRENORPHINE SERPL-MCNC: NEGATIVE NG/ML
CANNABINOIDS SERPL QL: NEGATIVE
COCAINE INTERNAL CONTROL: ABNORMAL
COCAINE UR QL: NEGATIVE
EXPIRATION DATE: ABNORMAL
Lab: ABNORMAL
MDMA (ECSTASY) INTERNAL CONTROL: ABNORMAL
MDMA UR QL SCN: NEGATIVE
METHADONE INTERNAL CONTROL: ABNORMAL
METHADONE UR QL SCN: NEGATIVE
METHAMPHETAMINE INTERNAL CONTROL: ABNORMAL
OPIATES INTERNAL CONTROL: ABNORMAL
OPIATES UR QL: NEGATIVE
OXYCODONE INTERNAL CONTROL: ABNORMAL
OXYCODONE UR QL SCN: POSITIVE
PCP UR QL SCN: NEGATIVE
PHENCYCLIDINE INTERNAL CONTROL: ABNORMAL
THC INTERNAL CONTROL: ABNORMAL

## 2025-06-19 PROCEDURE — 3075F SYST BP GE 130 - 139MM HG: CPT | Performed by: PHYSICIAN ASSISTANT

## 2025-06-19 PROCEDURE — 99214 OFFICE O/P EST MOD 30 MIN: CPT | Performed by: PHYSICIAN ASSISTANT

## 2025-06-19 PROCEDURE — G2211 COMPLEX E/M VISIT ADD ON: HCPCS | Performed by: PHYSICIAN ASSISTANT

## 2025-06-19 PROCEDURE — 3079F DIAST BP 80-89 MM HG: CPT | Performed by: PHYSICIAN ASSISTANT

## 2025-06-19 PROCEDURE — 1126F AMNT PAIN NOTED NONE PRSNT: CPT | Performed by: PHYSICIAN ASSISTANT

## 2025-06-19 RX ORDER — CETIRIZINE HYDROCHLORIDE 10 MG/1
10 TABLET ORAL
COMMUNITY
Start: 2025-05-22 | End: 2025-11-19

## 2025-06-19 RX ORDER — BUPROPION HYDROCHLORIDE 150 MG/1
150 TABLET ORAL DAILY
COMMUNITY

## 2025-06-19 RX ORDER — METHYLNALTREXONE BROMIDE 150 MG/1
450 TABLET ORAL DAILY
COMMUNITY
Start: 2025-06-11

## 2025-06-19 RX ORDER — TADALAFIL 10 MG/1
10 TABLET ORAL DAILY PRN
COMMUNITY
Start: 2025-05-22 | End: 2025-11-19

## 2025-06-19 RX ORDER — ESZOPICLONE 2 MG/1
2 TABLET, FILM COATED ORAL NIGHTLY
COMMUNITY

## 2025-06-19 RX ORDER — BUPRENORPHINE HYDROCHLORIDE 300 UG/1
300 FILM, SOLUBLE BUCCAL EVERY 12 HOURS
COMMUNITY
Start: 2025-06-18

## 2025-06-19 RX ORDER — ALBUTEROL SULFATE 90 UG/1
2 INHALANT RESPIRATORY (INHALATION) EVERY 6 HOURS PRN
COMMUNITY
Start: 2025-05-22

## 2025-06-19 RX ORDER — METHOCARBAMOL 750 MG/1
750 TABLET, FILM COATED ORAL 3 TIMES DAILY
COMMUNITY
Start: 2025-02-17

## 2025-06-20 DIAGNOSIS — K21.9 GASTROESOPHAGEAL REFLUX DISEASE, UNSPECIFIED WHETHER ESOPHAGITIS PRESENT: ICD-10-CM

## 2025-06-20 DIAGNOSIS — D64.9 ANEMIA, UNSPECIFIED TYPE: Primary | ICD-10-CM

## 2025-06-20 LAB
BASOPHILS # BLD AUTO: 0 X10E3/UL (ref 0–0.2)
BASOPHILS NFR BLD AUTO: 1 %
EOSINOPHIL # BLD AUTO: 0.2 X10E3/UL (ref 0–0.4)
EOSINOPHIL NFR BLD AUTO: 4 %
ERYTHROCYTE [DISTWIDTH] IN BLOOD BY AUTOMATED COUNT: 12.8 % (ref 11.6–15.4)
HCT VFR BLD AUTO: 39.5 % (ref 37.5–51)
HGB BLD-MCNC: 11.9 G/DL (ref 13–17.7)
IMM GRANULOCYTES # BLD AUTO: 0 X10E3/UL (ref 0–0.1)
IMM GRANULOCYTES NFR BLD AUTO: 0 %
LYMPHOCYTES # BLD AUTO: 1.7 X10E3/UL (ref 0.7–3.1)
LYMPHOCYTES NFR BLD AUTO: 32 %
MCH RBC QN AUTO: 29.8 PG (ref 26.6–33)
MCHC RBC AUTO-ENTMCNC: 30.1 G/DL (ref 31.5–35.7)
MCV RBC AUTO: 99 FL (ref 79–97)
MONOCYTES # BLD AUTO: 0.6 X10E3/UL (ref 0.1–0.9)
MONOCYTES NFR BLD AUTO: 11 %
NEUTROPHILS # BLD AUTO: 2.8 X10E3/UL (ref 1.4–7)
NEUTROPHILS NFR BLD AUTO: 52 %
PLATELET # BLD AUTO: 282 X10E3/UL (ref 150–450)
RBC # BLD AUTO: 3.99 X10E6/UL (ref 4.14–5.8)
WBC # BLD AUTO: 5.5 X10E3/UL (ref 3.4–10.8)

## 2025-06-23 NOTE — PROGRESS NOTES
"Chief Complaint  Establish Care    Subjective          Zechariah Contreras presents to [facility]  History of Present Illness  Patient in today to re-establish care and for medication recheck. He has recently had labs drawn but did show a mild decrease to his hemoglobin at 12.7. He states does have reflux symptoms- taking protonix. He believes is utd on colonoscopy but unsure of date- will try to get to get records. Denies blood in stool or black stool. He does take an anti-inflammatory for his chronic low back pain. He follows with endocrinology for his diabetes mgt and follows with pain management for his chronic lower back pain medications. He states the duloxetine , buspar and wellbutrin continue to work well for his depression and anxiety symptoms. He states has been on same dosages for past few years and has done well. Denies side effects of medication. He also states has been on Lunesta for past year or longer for insomnia and that also has helped. He denies daytime grogginess, headaches or dizziness.  He states the ropinirole dosage continues to work well for his restless leg symptoms. Denies side effects of medication.       Objective   Vital Signs:   /80   Pulse 65   Ht 188 cm (74\")   Wt 110 kg (242 lb)   SpO2 96%   BMI 31.07 kg/m²     Body mass index is 31.07 kg/m².    Review of Systems   Constitutional:  Negative for fatigue and fever.   HENT:  Negative for congestion and sore throat.    Respiratory:  Negative for cough and shortness of breath.    Cardiovascular:  Negative for chest pain and palpitations.   Gastrointestinal:  Positive for GERD. Negative for abdominal pain, blood in stool, diarrhea, nausea and vomiting.   Genitourinary:  Negative for dysuria and frequency.   Neurological:  Negative for dizziness and headache.   Psychiatric/Behavioral:  Positive for sleep disturbance and depressed mood. Negative for suicidal ideas. The patient is nervous/anxious.  "       Past History:  Medical History: has a past medical history of Anxiety, AR (allergic rhinitis), Asthma due to environmental allergies, Chronic insomnia, Chronic low back pain, Chronic neck pain, CKD (chronic kidney disease), stage II, Depression, ED (erectile dysfunction), Essential hypertension, Fatigue, GERD (gastroesophageal reflux disease), High risk medication use, IBS (irritable bowel syndrome), Idiopathic nonfamilial dystonia, Impotence, Insomnia, Insomnia, Major depression in remission, Mixed hyperlipidemia, Morbid obesity, JESSICA (obstructive sleep apnea), RLS (restless legs syndrome), Rotator cuff tear, Sinus headache, Snoring, Spasmodic torticollis, Type 2 diabetes mellitus, and Vitamin D deficiency.   Surgical History: has a past surgical history that includes Rotator cuff repair and Back surgery (2021).   Family History: family history includes Lung cancer in his father.   Social History: reports that he has never smoked. He has never used smokeless tobacco. He reports that he does not drink alcohol and does not use drugs.      Current Outpatient Medications:     albuterol sulfate  (90 Base) MCG/ACT inhaler, Inhale 2 puffs Every 6 (Six) Hours As Needed., Disp: , Rfl:     atorvastatin (LIPITOR) 40 MG tablet, Take one pill by oral route once a day., Disp: 90 tablet, Rfl: 1    baclofen (LIORESAL) 20 MG tablet, Take 1 tablet by mouth 3 (Three) Times a Day. As needed, Disp: 270 tablet, Rfl: 1    Belbuca 300 MCG film, Take 1 film by mouth Every 12 (Twelve) Hours., Disp: , Rfl:     buPROPion XL (WELLBUTRIN XL) 150 MG 24 hr tablet, Take 1 tablet by mouth Daily., Disp: , Rfl:     busPIRone (BUSPAR) 10 MG tablet, Take 1 tablet by mouth 2 (Two) Times a Day., Disp: 180 tablet, Rfl: 1    cetirizine (zyrTEC) 10 MG tablet, Take 1 tablet by mouth., Disp: , Rfl:     DULoxetine (CYMBALTA) 60 MG capsule, Take 1 capsule by mouth Daily., Disp: 90 capsule, Rfl: 1    empagliflozin (Jardiance) 25 MG tablet tablet,  Take 1 tablet by mouth Daily., Disp: 90 tablet, Rfl: 3    eszopiclone (Lunesta) 2 MG tablet, Take 1 tablet by mouth Every Night. Take immediately before bedtime, Disp: , Rfl:     ezetimibe (Zetia) 10 MG tablet, Take 1 tablet by mouth Daily., Disp: 90 tablet, Rfl: 3    fenofibrate (TRICOR) 145 MG tablet, TAKE 1 TABLET BY MOUTH DAILY, Disp: 90 tablet, Rfl: 0    fluticasone (Flonase) 50 MCG/ACT nasal spray, 2 sprays each nostril once a day (Patient taking differently: Administer 1 spray into the nostril(s) as directed by provider Daily.), Disp: 48 g, Rfl: 1    glucose blood test strip, Check FSBS BID, Disp: 100 each, Rfl: 3    glucose monitor monitoring kit, Use to monitor BG up to 2 times daily, Disp: 1 each, Rfl: 0    Insulin Pen Needle (NovoFine Plus Pen Needle) 32G X 4 MM misc, Use weekly with ozempic, Disp: 30 each, Rfl: 3    Lancets misc, Check FSBS BID, Disp: 100 each, Rfl: 3    methocarbamol (ROBAXIN) 750 MG tablet, Take 1 tablet by mouth 3 times a day., Disp: , Rfl:     metoprolol succinate XL (TOPROL-XL) 200 MG 24 hr tablet, Take 1 tablet by mouth Daily., Disp: 90 tablet, Rfl: 1    NIFEdipine CC (ADALAT CC) 30 MG 24 hr tablet, Take 1 tablet by mouth Daily., Disp: 90 tablet, Rfl: 1    olmesartan (BENICAR) 20 MG tablet, Take 1 tablet by mouth Daily., Disp: , Rfl:     oxyCODONE-acetaminophen (PERCOCET)  MG per tablet, Take 1 tablet by mouth Every 6 (Six) Hours., Disp: , Rfl:     pantoprazole (PROTONIX) 40 MG EC tablet, Take 1 tablet by mouth Daily., Disp: , Rfl:     promethazine (PHENERGAN) 12.5 MG tablet, Take 1 tablet by mouth Every 6 (Six) Hours As Needed for Nausea or Vomiting., Disp: 12 tablet, Rfl: 0    Relistor 150 MG tablet, Take 3 tablets by mouth Daily. Takes 3 in the am, Disp: , Rfl:     rOPINIRole (REQUIP) 1 MG tablet, Take 1 tablet by mouth Every Night., Disp: 90 tablet, Rfl: 1    Semaglutide,0.25 or 0.5MG/DOS, (Ozempic, 0.25 or 0.5 MG/DOSE,) 2 MG/3ML solution pen-injector, Inject 0.5 mg under  the skin into the appropriate area as directed 1 (One) Time Per Week., Disp: 3 mL, Rfl: 1    tadalafil (CIALIS) 10 MG tablet, Take 1 tablet by mouth Daily As Needed., Disp: , Rfl:   Allergies: Amoxicillin    Physical Exam        Assessment and Plan   Diagnoses and all orders for this visit:    1. Anemia, unspecified type (Primary)  -     CBC & Differential  Will recheck cbc and discussed with GERD symptoms and use of nsaid, if remains low would recommend to see GI and discontinue nsaid  2. Drug therapy  -     POC Urine Drug Screen [facility]    3. Hypertension, essential  Continue current medication along with healthy diet and exercise   4. Hyperlipidemia, mixed  Continue cholesterol medications along with healthy diet and exercise  5. Restless leg syndrome  Continue ropinirole-monitor for but  denies side effects of medication   6. Major depressive disorder, recurrent, mild  He states is pleased with the current dosages of wellbutrin, duloxetine and buspar- denies side effects of medication; recommend to rtc prior to recheck as needed   7. Generalized anxiety disorder    8. Insomnia, unspecified type  Will discuss Lunesta prescription with MD- urine drug screen and lucius today. Patient states has been on medication and denies side effects.   9. Type 2 diabetes mellitus with hyperglycemia, without long-term current use of insulin  Continue medication and f/up with endocrinology.   10. Gastroesophageal reflux disease, unspecified whether esophagitis present  Continue medication and will put in referral for GI   11. Allergic rhinitis, unspecified seasonality, unspecified trigger  Continue allergy medication  12. Chronic bilateral low back pain, unspecified whether sciatica present  Continue f/up with pain mgt for pain medications and muscle relaxers as needed           Follow Up   No follow-ups on file.  Patient was given instructions and counseling regarding his condition or for health maintenance advice.  Please see specific information pulled into the AVS if appropriate.     Divya Elise PA-C

## 2025-06-25 ENCOUNTER — TELEPHONE (OUTPATIENT)
Dept: FAMILY MEDICINE CLINIC | Facility: CLINIC | Age: 65
End: 2025-06-25
Payer: MEDICARE

## 2025-06-25 DIAGNOSIS — G47.00 INSOMNIA, UNSPECIFIED TYPE: Primary | ICD-10-CM

## 2025-06-25 NOTE — TELEPHONE ENCOUNTER
Please let him know we are unable to refill the Lunesta for insomnia but have placed a referral for sleep medicine consult to discuss medication refill/insomnia concerns; also we had discussed when here, but  please remind patient that he will need to get refills on muscle relaxers from pain mgt if they are to be continued for his chronic back pain - that would be the baclofen and methocarbamol--- thanks

## 2025-07-02 ENCOUNTER — TELEPHONE (OUTPATIENT)
Dept: GASTROENTEROLOGY | Facility: CLINIC | Age: 65
End: 2025-07-02
Payer: MEDICARE

## 2025-07-02 NOTE — TELEPHONE ENCOUNTER
X1-lvm for pt to see if he could come in earlier for his EGD procedure with Dr. Crawley on 7/3/25 due to schedule change.

## 2025-07-03 ENCOUNTER — OUTSIDE FACILITY SERVICE (OUTPATIENT)
Dept: GASTROENTEROLOGY | Facility: CLINIC | Age: 65
End: 2025-07-03
Payer: MEDICARE

## 2025-07-03 PROCEDURE — 43239 EGD BIOPSY SINGLE/MULTIPLE: CPT | Performed by: INTERNAL MEDICINE

## 2025-07-08 ENCOUNTER — TELEPHONE (OUTPATIENT)
Dept: GASTROENTEROLOGY | Facility: CLINIC | Age: 65
End: 2025-07-08
Payer: MEDICARE

## 2025-07-25 DIAGNOSIS — E78.2 HYPERLIPIDEMIA, MIXED: ICD-10-CM

## 2025-07-25 RX ORDER — EZETIMIBE 10 MG/1
10 TABLET ORAL DAILY
Qty: 60 TABLET | Refills: 0 | Status: SHIPPED | OUTPATIENT
Start: 2025-07-25

## 2025-07-25 NOTE — TELEPHONE ENCOUNTER
Rx Refill Note  Requested Prescriptions     Pending Prescriptions Disp Refills    ezetimibe (ZETIA) 10 MG tablet [Pharmacy Med Name: EZETIMIBE 10 MG TABLET] 60 tablet 0     Sig: TAKE 1 TABLET BY MOUTH DAILY      Last office visit with prescribing clinician: 7/5/2024     Next office visit with prescribing clinician: 9/5/2025     Nidia Khan MA  07/25/25, 13:28 EDT

## 2025-07-30 DIAGNOSIS — E78.2 HYPERLIPIDEMIA, MIXED: ICD-10-CM

## 2025-07-31 RX ORDER — FENOFIBRATE 145 MG/1
145 TABLET, FILM COATED ORAL DAILY
Qty: 90 TABLET | Refills: 0 | Status: SHIPPED | OUTPATIENT
Start: 2025-07-31

## 2025-07-31 NOTE — TELEPHONE ENCOUNTER
Rx Refill Note  Requested Prescriptions     Pending Prescriptions Disp Refills    fenofibrate (TRICOR) 145 MG tablet [Pharmacy Med Name: FENOFIBRATE 145 MG TABLET] 90 tablet 0     Sig: TAKE 1 TABLET BY MOUTH DAILY      Last office visit with prescribing clinician: 7/5/2024     Next office visit with prescribing clinician: 9/5/2025

## 2025-08-07 DIAGNOSIS — G25.81 RESTLESS LEG SYNDROME: Primary | ICD-10-CM

## 2025-08-07 RX ORDER — ESZOPICLONE 2 MG/1
2 TABLET, FILM COATED ORAL NIGHTLY
Qty: 30 TABLET | Refills: 2 | Status: SHIPPED | OUTPATIENT
Start: 2025-08-07